# Patient Record
Sex: MALE | Race: WHITE | NOT HISPANIC OR LATINO | Employment: OTHER | URBAN - METROPOLITAN AREA
[De-identification: names, ages, dates, MRNs, and addresses within clinical notes are randomized per-mention and may not be internally consistent; named-entity substitution may affect disease eponyms.]

---

## 2022-07-20 PROCEDURE — 93005 ELECTROCARDIOGRAM TRACING: CPT

## 2022-07-20 PROCEDURE — 99284 EMERGENCY DEPT VISIT MOD MDM: CPT

## 2022-07-21 ENCOUNTER — HOSPITAL ENCOUNTER (INPATIENT)
Facility: HOSPITAL | Age: 82
LOS: 5 days | Discharge: HOME | End: 2022-07-26
Attending: EMERGENCY MEDICINE | Admitting: HOSPITALIST

## 2022-07-21 ENCOUNTER — APPOINTMENT (OUTPATIENT)
Dept: GENERAL RADIOLOGY | Facility: HOSPITAL | Age: 82
End: 2022-07-21

## 2022-07-21 DIAGNOSIS — J18.9 PNEUMONIA OF RIGHT MIDDLE LOBE DUE TO INFECTIOUS ORGANISM: ICD-10-CM

## 2022-07-21 DIAGNOSIS — D72.829 LEUKOCYTOSIS, UNSPECIFIED TYPE: Primary | ICD-10-CM

## 2022-07-21 DIAGNOSIS — Z20.822 COVID-19 RULED OUT BY LABORATORY TESTING: ICD-10-CM

## 2022-07-21 DIAGNOSIS — R06.00 DYSPNEA, UNSPECIFIED TYPE: ICD-10-CM

## 2022-07-21 LAB
ALBUMIN SERPL-MCNC: 3.9 G/DL (ref 3.5–5.2)
ALBUMIN/GLOB SERPL: 1.6 G/DL
ALP SERPL-CCNC: 113 U/L (ref 39–117)
ALT SERPL W P-5'-P-CCNC: 12 U/L (ref 1–41)
ANION GAP SERPL CALCULATED.3IONS-SCNC: 12 MMOL/L (ref 5–15)
ANION GAP SERPL CALCULATED.3IONS-SCNC: 13 MMOL/L (ref 5–15)
AST SERPL-CCNC: 20 U/L (ref 1–40)
B PARAPERT DNA SPEC QL NAA+PROBE: NOT DETECTED
B PERT DNA SPEC QL NAA+PROBE: NOT DETECTED
BILIRUB SERPL-MCNC: 1 MG/DL (ref 0–1.2)
BUN SERPL-MCNC: 15 MG/DL (ref 8–23)
BUN SERPL-MCNC: 15 MG/DL (ref 8–23)
BUN/CREAT SERPL: 12.3 (ref 7–25)
BUN/CREAT SERPL: 13 (ref 7–25)
C PNEUM DNA NPH QL NAA+NON-PROBE: NOT DETECTED
CALCIUM SPEC-SCNC: 8.2 MG/DL (ref 8.6–10.5)
CALCIUM SPEC-SCNC: 8.2 MG/DL (ref 8.6–10.5)
CHLORIDE SERPL-SCNC: 100 MMOL/L (ref 98–107)
CHLORIDE SERPL-SCNC: 101 MMOL/L (ref 98–107)
CO2 SERPL-SCNC: 21 MMOL/L (ref 22–29)
CO2 SERPL-SCNC: 24 MMOL/L (ref 22–29)
CREAT SERPL-MCNC: 1.15 MG/DL (ref 0.76–1.27)
CREAT SERPL-MCNC: 1.22 MG/DL (ref 0.76–1.27)
D-LACTATE SERPL-SCNC: 0.5 MMOL/L (ref 0.5–2)
DEPRECATED RDW RBC AUTO: 45.5 FL (ref 37–54)
DEPRECATED RDW RBC AUTO: 45.5 FL (ref 37–54)
EGFRCR SERPLBLD CKD-EPI 2021: 60.3 ML/MIN/1.73
EGFRCR SERPLBLD CKD-EPI 2021: 64.7 ML/MIN/1.73
ERYTHROCYTE [DISTWIDTH] IN BLOOD BY AUTOMATED COUNT: 14.3 % (ref 12.3–15.4)
ERYTHROCYTE [DISTWIDTH] IN BLOOD BY AUTOMATED COUNT: 14.3 % (ref 12.3–15.4)
FLUAV SUBTYP SPEC NAA+PROBE: NOT DETECTED
FLUBV RNA ISLT QL NAA+PROBE: NOT DETECTED
GLOBULIN UR ELPH-MCNC: 2.5 GM/DL
GLUCOSE SERPL-MCNC: 113 MG/DL (ref 65–99)
GLUCOSE SERPL-MCNC: 157 MG/DL (ref 65–99)
HADV DNA SPEC NAA+PROBE: NOT DETECTED
HCOV 229E RNA SPEC QL NAA+PROBE: NOT DETECTED
HCOV HKU1 RNA SPEC QL NAA+PROBE: NOT DETECTED
HCOV NL63 RNA SPEC QL NAA+PROBE: NOT DETECTED
HCOV OC43 RNA SPEC QL NAA+PROBE: NOT DETECTED
HCT VFR BLD AUTO: 36.7 % (ref 37.5–51)
HCT VFR BLD AUTO: 38.5 % (ref 37.5–51)
HGB BLD-MCNC: 12.1 G/DL (ref 13–17.7)
HGB BLD-MCNC: 12.6 G/DL (ref 13–17.7)
HMPV RNA NPH QL NAA+NON-PROBE: NOT DETECTED
HPIV1 RNA ISLT QL NAA+PROBE: NOT DETECTED
HPIV2 RNA SPEC QL NAA+PROBE: NOT DETECTED
HPIV3 RNA NPH QL NAA+PROBE: NOT DETECTED
HPIV4 P GENE NPH QL NAA+PROBE: NOT DETECTED
LYMPHOCYTES # BLD MANUAL: 0.35 10*3/MM3 (ref 0.7–3.1)
LYMPHOCYTES # BLD MANUAL: 0.59 10*3/MM3 (ref 0.7–3.1)
LYMPHOCYTES NFR BLD MANUAL: 2 % (ref 5–12)
LYMPHOCYTES NFR BLD MANUAL: 2 % (ref 5–12)
M PNEUMO IGG SER IA-ACNC: NOT DETECTED
MCH RBC QN AUTO: 30.1 PG (ref 26.6–33)
MCH RBC QN AUTO: 30.2 PG (ref 26.6–33)
MCHC RBC AUTO-ENTMCNC: 32.7 G/DL (ref 31.5–35.7)
MCHC RBC AUTO-ENTMCNC: 32.8 G/DL (ref 31.5–35.7)
MCV RBC AUTO: 91.8 FL (ref 79–97)
MCV RBC AUTO: 91.8 FL (ref 79–97)
METAMYELOCYTES NFR BLD MANUAL: 1 % (ref 0–0)
METAMYELOCYTES NFR BLD MANUAL: 1 % (ref 0–0)
MONOCYTES # BLD: 0.35 10*3/MM3 (ref 0.1–0.9)
MONOCYTES # BLD: 0.39 10*3/MM3 (ref 0.1–0.9)
NEUTROPHILS # BLD AUTO: 16.44 10*3/MM3 (ref 1.7–7)
NEUTROPHILS # BLD AUTO: 18.52 10*3/MM3 (ref 1.7–7)
NEUTROPHILS NFR BLD MANUAL: 77 % (ref 42.7–76)
NEUTROPHILS NFR BLD MANUAL: 85 % (ref 42.7–76)
NEUTS BAND NFR BLD MANUAL: 18 % (ref 0–5)
NEUTS BAND NFR BLD MANUAL: 9 % (ref 0–5)
NT-PROBNP SERPL-MCNC: 593.1 PG/ML (ref 0–1800)
PLAT MORPH BLD: NORMAL
PLATELET # BLD AUTO: 217 10*3/MM3 (ref 140–450)
PLATELET # BLD AUTO: 232 10*3/MM3 (ref 140–450)
PMV BLD AUTO: 8.1 FL (ref 6–12)
PMV BLD AUTO: 8.2 FL (ref 6–12)
POIKILOCYTOSIS BLD QL SMEAR: ABNORMAL
POTASSIUM SERPL-SCNC: 4 MMOL/L (ref 3.5–5.2)
POTASSIUM SERPL-SCNC: 4.6 MMOL/L (ref 3.5–5.2)
PROCALCITONIN SERPL-MCNC: 0.47 NG/ML (ref 0–0.25)
PROT SERPL-MCNC: 6.4 G/DL (ref 6–8.5)
RBC # BLD AUTO: 4 10*6/MM3 (ref 4.14–5.8)
RBC # BLD AUTO: 4.19 10*6/MM3 (ref 4.14–5.8)
RBC MORPH BLD: NORMAL
RHINOVIRUS RNA SPEC NAA+PROBE: NOT DETECTED
RSV RNA NPH QL NAA+NON-PROBE: NOT DETECTED
SARS-COV-2 RNA NPH QL NAA+NON-PROBE: NOT DETECTED
SCAN SLIDE: NORMAL
SCAN SLIDE: NORMAL
SMALL PLATELETS BLD QL SMEAR: ADEQUATE
SODIUM SERPL-SCNC: 134 MMOL/L (ref 136–145)
SODIUM SERPL-SCNC: 137 MMOL/L (ref 136–145)
TROPONIN T SERPL-MCNC: <0.01 NG/ML (ref 0–0.03)
VARIANT LYMPHS NFR BLD MANUAL: 2 % (ref 19.6–45.3)
VARIANT LYMPHS NFR BLD MANUAL: 3 % (ref 19.6–45.3)
WBC MORPH BLD: NORMAL
WBC MORPH BLD: NORMAL
WBC NRBC COR # BLD: 17.3 10*3/MM3 (ref 3.4–10.8)
WBC NRBC COR # BLD: 19.7 10*3/MM3 (ref 3.4–10.8)

## 2022-07-21 PROCEDURE — 25010000002 ENOXAPARIN PER 10 MG: Performed by: INTERNAL MEDICINE

## 2022-07-21 PROCEDURE — 84484 ASSAY OF TROPONIN QUANT: CPT | Performed by: NURSE PRACTITIONER

## 2022-07-21 PROCEDURE — 83880 ASSAY OF NATRIURETIC PEPTIDE: CPT | Performed by: NURSE PRACTITIONER

## 2022-07-21 PROCEDURE — 94799 UNLISTED PULMONARY SVC/PX: CPT

## 2022-07-21 PROCEDURE — 94640 AIRWAY INHALATION TREATMENT: CPT

## 2022-07-21 PROCEDURE — 80053 COMPREHEN METABOLIC PANEL: CPT | Performed by: NURSE PRACTITIONER

## 2022-07-21 PROCEDURE — 71045 X-RAY EXAM CHEST 1 VIEW: CPT

## 2022-07-21 PROCEDURE — 85007 BL SMEAR W/DIFF WBC COUNT: CPT | Performed by: HOSPITALIST

## 2022-07-21 PROCEDURE — 85025 COMPLETE CBC W/AUTO DIFF WBC: CPT | Performed by: NURSE PRACTITIONER

## 2022-07-21 PROCEDURE — 84145 PROCALCITONIN (PCT): CPT | Performed by: INTERNAL MEDICINE

## 2022-07-21 PROCEDURE — 99222 1ST HOSP IP/OBS MODERATE 55: CPT | Performed by: HOSPITALIST

## 2022-07-21 PROCEDURE — 85025 COMPLETE CBC W/AUTO DIFF WBC: CPT | Performed by: HOSPITALIST

## 2022-07-21 PROCEDURE — 25010000002 METHYLPREDNISOLONE PER 125 MG: Performed by: NURSE PRACTITIONER

## 2022-07-21 PROCEDURE — 25010000002 AZITHROMYCIN PER 500 MG: Performed by: HOSPITALIST

## 2022-07-21 PROCEDURE — 87040 BLOOD CULTURE FOR BACTERIA: CPT | Performed by: NURSE PRACTITIONER

## 2022-07-21 PROCEDURE — 0202U NFCT DS 22 TRGT SARS-COV-2: CPT | Performed by: NURSE PRACTITIONER

## 2022-07-21 PROCEDURE — 85007 BL SMEAR W/DIFF WBC COUNT: CPT | Performed by: NURSE PRACTITIONER

## 2022-07-21 PROCEDURE — 36415 COLL VENOUS BLD VENIPUNCTURE: CPT

## 2022-07-21 PROCEDURE — 83605 ASSAY OF LACTIC ACID: CPT

## 2022-07-21 PROCEDURE — 63710000001 PREDNISONE PER 1 MG: Performed by: HOSPITALIST

## 2022-07-21 PROCEDURE — 36415 COLL VENOUS BLD VENIPUNCTURE: CPT | Performed by: HOSPITALIST

## 2022-07-21 PROCEDURE — 25010000002 CEFTRIAXONE PER 250 MG: Performed by: NURSE PRACTITIONER

## 2022-07-21 RX ORDER — METHYLPREDNISOLONE SODIUM SUCCINATE 125 MG/2ML
125 INJECTION, POWDER, LYOPHILIZED, FOR SOLUTION INTRAMUSCULAR; INTRAVENOUS ONCE
Status: COMPLETED | OUTPATIENT
Start: 2022-07-21 | End: 2022-07-21

## 2022-07-21 RX ORDER — BENZONATATE 100 MG/1
100 CAPSULE ORAL DAILY
COMMUNITY
End: 2022-07-21

## 2022-07-21 RX ORDER — ONDANSETRON 4 MG/1
4 TABLET, FILM COATED ORAL EVERY 6 HOURS PRN
Status: DISCONTINUED | OUTPATIENT
Start: 2022-07-21 | End: 2022-07-26 | Stop reason: HOSPADM

## 2022-07-21 RX ORDER — LOSARTAN POTASSIUM 25 MG/1
100 TABLET ORAL
Status: DISCONTINUED | OUTPATIENT
Start: 2022-07-21 | End: 2022-07-26 | Stop reason: HOSPADM

## 2022-07-21 RX ORDER — PROPRANOLOL HYDROCHLORIDE 10 MG/1
10 TABLET ORAL 2 TIMES DAILY
Status: DISCONTINUED | OUTPATIENT
Start: 2022-07-21 | End: 2022-07-23

## 2022-07-21 RX ORDER — ATORVASTATIN CALCIUM 20 MG/1
20 TABLET, FILM COATED ORAL DAILY
COMMUNITY

## 2022-07-21 RX ORDER — MELATONIN
1000 DAILY
Status: DISCONTINUED | OUTPATIENT
Start: 2022-07-21 | End: 2022-07-26 | Stop reason: HOSPADM

## 2022-07-21 RX ORDER — IPRATROPIUM BROMIDE AND ALBUTEROL SULFATE 2.5; .5 MG/3ML; MG/3ML
3 SOLUTION RESPIRATORY (INHALATION) ONCE
Status: COMPLETED | OUTPATIENT
Start: 2022-07-21 | End: 2022-07-21

## 2022-07-21 RX ORDER — ALUMINA, MAGNESIA, AND SIMETHICONE 2400; 2400; 240 MG/30ML; MG/30ML; MG/30ML
15 SUSPENSION ORAL EVERY 6 HOURS PRN
Status: DISCONTINUED | OUTPATIENT
Start: 2022-07-21 | End: 2022-07-26 | Stop reason: HOSPADM

## 2022-07-21 RX ORDER — IPRATROPIUM BROMIDE AND ALBUTEROL SULFATE 2.5; .5 MG/3ML; MG/3ML
3 SOLUTION RESPIRATORY (INHALATION) EVERY 4 HOURS PRN
COMMUNITY
End: 2023-02-10 | Stop reason: SDUPTHER

## 2022-07-21 RX ORDER — BUDESONIDE AND FORMOTEROL FUMARATE DIHYDRATE 80; 4.5 UG/1; UG/1
2 AEROSOL RESPIRATORY (INHALATION)
Status: DISCONTINUED | OUTPATIENT
Start: 2022-07-21 | End: 2022-07-22

## 2022-07-21 RX ORDER — OMEPRAZOLE 20 MG/1
20 CAPSULE, DELAYED RELEASE ORAL DAILY
COMMUNITY
End: 2023-02-10 | Stop reason: SDUPTHER

## 2022-07-21 RX ORDER — IPRATROPIUM BROMIDE AND ALBUTEROL SULFATE 2.5; .5 MG/3ML; MG/3ML
3 SOLUTION RESPIRATORY (INHALATION)
Status: DISCONTINUED | OUTPATIENT
Start: 2022-07-21 | End: 2022-07-23

## 2022-07-21 RX ORDER — ONDANSETRON 2 MG/ML
4 INJECTION INTRAMUSCULAR; INTRAVENOUS EVERY 6 HOURS PRN
Status: DISCONTINUED | OUTPATIENT
Start: 2022-07-21 | End: 2022-07-26 | Stop reason: HOSPADM

## 2022-07-21 RX ORDER — PREDNISONE 20 MG/1
40 TABLET ORAL
Status: DISCONTINUED | OUTPATIENT
Start: 2022-07-21 | End: 2022-07-23

## 2022-07-21 RX ORDER — CHOLECALCIFEROL (VITAMIN D3) 125 MCG
5 CAPSULE ORAL NIGHTLY PRN
Status: DISCONTINUED | OUTPATIENT
Start: 2022-07-21 | End: 2022-07-26 | Stop reason: HOSPADM

## 2022-07-21 RX ORDER — BENZONATATE 100 MG/1
100 CAPSULE ORAL 3 TIMES DAILY PRN
Status: DISCONTINUED | OUTPATIENT
Start: 2022-07-21 | End: 2022-07-26 | Stop reason: HOSPADM

## 2022-07-21 RX ORDER — IPRATROPIUM BROMIDE AND ALBUTEROL SULFATE 2.5; .5 MG/3ML; MG/3ML
3 SOLUTION RESPIRATORY (INHALATION) EVERY 4 HOURS PRN
Status: DISCONTINUED | OUTPATIENT
Start: 2022-07-21 | End: 2022-07-26 | Stop reason: HOSPADM

## 2022-07-21 RX ORDER — IRBESARTAN 75 MG/1
75 TABLET ORAL DAILY
COMMUNITY
End: 2022-07-21

## 2022-07-21 RX ORDER — MELATONIN
1000 DAILY
COMMUNITY

## 2022-07-21 RX ORDER — SODIUM CHLORIDE 0.9 % (FLUSH) 0.9 %
10 SYRINGE (ML) INJECTION AS NEEDED
Status: DISCONTINUED | OUTPATIENT
Start: 2022-07-21 | End: 2022-07-26 | Stop reason: HOSPADM

## 2022-07-21 RX ORDER — HYDROCHLOROTHIAZIDE 25 MG/1
25 TABLET ORAL DAILY
Status: DISCONTINUED | OUTPATIENT
Start: 2022-07-21 | End: 2022-07-24

## 2022-07-21 RX ORDER — ACETAMINOPHEN 650 MG/1
650 SUPPOSITORY RECTAL EVERY 4 HOURS PRN
Status: DISCONTINUED | OUTPATIENT
Start: 2022-07-21 | End: 2022-07-26 | Stop reason: HOSPADM

## 2022-07-21 RX ORDER — ALBUTEROL SULFATE 2.5 MG/3ML
2.5 SOLUTION RESPIRATORY (INHALATION) EVERY 4 HOURS PRN
Status: DISCONTINUED | OUTPATIENT
Start: 2022-07-21 | End: 2022-07-26 | Stop reason: HOSPADM

## 2022-07-21 RX ORDER — ATORVASTATIN CALCIUM 20 MG/1
20 TABLET, FILM COATED ORAL NIGHTLY
Status: DISCONTINUED | OUTPATIENT
Start: 2022-07-21 | End: 2022-07-26 | Stop reason: HOSPADM

## 2022-07-21 RX ORDER — SODIUM CHLORIDE 0.9 % (FLUSH) 0.9 %
10 SYRINGE (ML) INJECTION EVERY 12 HOURS SCHEDULED
Status: DISCONTINUED | OUTPATIENT
Start: 2022-07-21 | End: 2022-07-26 | Stop reason: HOSPADM

## 2022-07-21 RX ORDER — HYDROCHLOROTHIAZIDE 25 MG/1
25 TABLET ORAL DAILY
COMMUNITY
End: 2022-07-26 | Stop reason: HOSPADM

## 2022-07-21 RX ORDER — NITROGLYCERIN 0.4 MG/1
0.4 TABLET SUBLINGUAL
Status: DISCONTINUED | OUTPATIENT
Start: 2022-07-21 | End: 2022-07-26 | Stop reason: HOSPADM

## 2022-07-21 RX ORDER — ENOXAPARIN SODIUM 100 MG/ML
40 INJECTION SUBCUTANEOUS DAILY
Status: DISCONTINUED | OUTPATIENT
Start: 2022-07-21 | End: 2022-07-26 | Stop reason: HOSPADM

## 2022-07-21 RX ORDER — ACETAMINOPHEN 325 MG/1
650 TABLET ORAL EVERY 4 HOURS PRN
Status: DISCONTINUED | OUTPATIENT
Start: 2022-07-21 | End: 2022-07-26 | Stop reason: HOSPADM

## 2022-07-21 RX ORDER — ALBUTEROL SULFATE 90 UG/1
1 AEROSOL, METERED RESPIRATORY (INHALATION) EVERY 4 HOURS PRN
COMMUNITY
End: 2023-02-10 | Stop reason: SDUPTHER

## 2022-07-21 RX ORDER — PANTOPRAZOLE SODIUM 40 MG/1
40 TABLET, DELAYED RELEASE ORAL EVERY MORNING
Status: DISCONTINUED | OUTPATIENT
Start: 2022-07-22 | End: 2022-07-26 | Stop reason: HOSPADM

## 2022-07-21 RX ORDER — IRBESARTAN 300 MG/1
300 TABLET ORAL DAILY
COMMUNITY
End: 2023-02-10

## 2022-07-21 RX ORDER — BENZONATATE 100 MG/1
100 CAPSULE ORAL 3 TIMES DAILY PRN
COMMUNITY
End: 2023-02-10

## 2022-07-21 RX ORDER — IPRATROPIUM BROMIDE AND ALBUTEROL SULFATE 2.5; .5 MG/3ML; MG/3ML
3 SOLUTION RESPIRATORY (INHALATION) AS NEEDED
COMMUNITY
End: 2022-07-21

## 2022-07-21 RX ORDER — ACETAMINOPHEN 160 MG/5ML
650 SOLUTION ORAL EVERY 4 HOURS PRN
Status: DISCONTINUED | OUTPATIENT
Start: 2022-07-21 | End: 2022-07-26 | Stop reason: HOSPADM

## 2022-07-21 RX ORDER — PROPRANOLOL HYDROCHLORIDE 10 MG/1
10 TABLET ORAL 2 TIMES DAILY
COMMUNITY
End: 2022-07-26 | Stop reason: HOSPADM

## 2022-07-21 RX ADMIN — Medication 1000 UNITS: at 15:48

## 2022-07-21 RX ADMIN — PREDNISONE 40 MG: 20 TABLET ORAL at 08:23

## 2022-07-21 RX ADMIN — METHYLPREDNISOLONE SODIUM SUCCINATE 125 MG: 125 INJECTION, POWDER, FOR SOLUTION INTRAMUSCULAR; INTRAVENOUS at 01:40

## 2022-07-21 RX ADMIN — LOSARTAN POTASSIUM 100 MG: 25 TABLET, FILM COATED ORAL at 15:47

## 2022-07-21 RX ADMIN — PROPRANOLOL HYDROCHLORIDE 10 MG: 10 TABLET ORAL at 20:34

## 2022-07-21 RX ADMIN — CEFTRIAXONE 1 G: 1 INJECTION, POWDER, FOR SOLUTION INTRAMUSCULAR; INTRAVENOUS at 02:40

## 2022-07-21 RX ADMIN — HYDROCHLOROTHIAZIDE 25 MG: 25 TABLET ORAL at 15:48

## 2022-07-21 RX ADMIN — IPRATROPIUM BROMIDE AND ALBUTEROL SULFATE 3 ML: .5; 3 SOLUTION RESPIRATORY (INHALATION) at 03:09

## 2022-07-21 RX ADMIN — IPRATROPIUM BROMIDE AND ALBUTEROL SULFATE 3 ML: .5; 3 SOLUTION RESPIRATORY (INHALATION) at 07:42

## 2022-07-21 RX ADMIN — IPRATROPIUM BROMIDE AND ALBUTEROL SULFATE 3 ML: .5; 3 SOLUTION RESPIRATORY (INHALATION) at 19:12

## 2022-07-21 RX ADMIN — BUDESONIDE AND FORMOTEROL FUMARATE DIHYDRATE 2 PUFF: 80; 4.5 AEROSOL RESPIRATORY (INHALATION) at 19:12

## 2022-07-21 RX ADMIN — ENOXAPARIN SODIUM 40 MG: 100 INJECTION SUBCUTANEOUS at 15:48

## 2022-07-21 RX ADMIN — IPRATROPIUM BROMIDE AND ALBUTEROL SULFATE 3 ML: .5; 3 SOLUTION RESPIRATORY (INHALATION) at 11:01

## 2022-07-21 RX ADMIN — Medication 10 ML: at 20:38

## 2022-07-21 RX ADMIN — Medication 10 ML: at 08:24

## 2022-07-21 RX ADMIN — ATORVASTATIN CALCIUM 20 MG: 20 TABLET, FILM COATED ORAL at 20:34

## 2022-07-21 RX ADMIN — AZITHROMYCIN MONOHYDRATE 500 MG: 500 INJECTION, POWDER, LYOPHILIZED, FOR SOLUTION INTRAVENOUS at 05:27

## 2022-07-22 LAB — PROCALCITONIN SERPL-MCNC: 0.26 NG/ML (ref 0–0.25)

## 2022-07-22 PROCEDURE — 94799 UNLISTED PULMONARY SVC/PX: CPT

## 2022-07-22 PROCEDURE — 94761 N-INVAS EAR/PLS OXIMETRY MLT: CPT

## 2022-07-22 PROCEDURE — 63710000001 PREDNISONE PER 1 MG: Performed by: INTERNAL MEDICINE

## 2022-07-22 PROCEDURE — 94664 DEMO&/EVAL PT USE INHALER: CPT

## 2022-07-22 PROCEDURE — 99232 SBSQ HOSP IP/OBS MODERATE 35: CPT | Performed by: INTERNAL MEDICINE

## 2022-07-22 PROCEDURE — 25010000002 ENOXAPARIN PER 10 MG: Performed by: INTERNAL MEDICINE

## 2022-07-22 PROCEDURE — 25010000002 AZITHROMYCIN PER 500 MG: Performed by: INTERNAL MEDICINE

## 2022-07-22 PROCEDURE — 25010000002 CEFTRIAXONE PER 250 MG: Performed by: INTERNAL MEDICINE

## 2022-07-22 PROCEDURE — 84145 PROCALCITONIN (PCT): CPT | Performed by: INTERNAL MEDICINE

## 2022-07-22 RX ORDER — ARFORMOTEROL TARTRATE 15 UG/2ML
15 SOLUTION RESPIRATORY (INHALATION)
Status: DISCONTINUED | OUTPATIENT
Start: 2022-07-22 | End: 2022-07-26 | Stop reason: HOSPADM

## 2022-07-22 RX ORDER — BUDESONIDE 0.5 MG/2ML
0.5 INHALANT ORAL
Status: DISCONTINUED | OUTPATIENT
Start: 2022-07-22 | End: 2022-07-26 | Stop reason: HOSPADM

## 2022-07-22 RX ADMIN — AZITHROMYCIN MONOHYDRATE 500 MG: 500 INJECTION, POWDER, LYOPHILIZED, FOR SOLUTION INTRAVENOUS at 03:02

## 2022-07-22 RX ADMIN — LOSARTAN POTASSIUM 100 MG: 25 TABLET, FILM COATED ORAL at 12:22

## 2022-07-22 RX ADMIN — Medication 10 ML: at 08:18

## 2022-07-22 RX ADMIN — HYDROCHLOROTHIAZIDE 25 MG: 25 TABLET ORAL at 08:18

## 2022-07-22 RX ADMIN — IPRATROPIUM BROMIDE AND ALBUTEROL SULFATE 3 ML: .5; 3 SOLUTION RESPIRATORY (INHALATION) at 19:59

## 2022-07-22 RX ADMIN — BUDESONIDE 0.5 MG: 0.5 INHALANT RESPIRATORY (INHALATION) at 19:59

## 2022-07-22 RX ADMIN — BUDESONIDE AND FORMOTEROL FUMARATE DIHYDRATE 2 PUFF: 80; 4.5 AEROSOL RESPIRATORY (INHALATION) at 07:20

## 2022-07-22 RX ADMIN — IPRATROPIUM BROMIDE AND ALBUTEROL SULFATE 3 ML: .5; 3 SOLUTION RESPIRATORY (INHALATION) at 11:07

## 2022-07-22 RX ADMIN — PREDNISONE 40 MG: 20 TABLET ORAL at 08:18

## 2022-07-22 RX ADMIN — IPRATROPIUM BROMIDE AND ALBUTEROL SULFATE 3 ML: .5; 3 SOLUTION RESPIRATORY (INHALATION) at 07:20

## 2022-07-22 RX ADMIN — PROPRANOLOL HYDROCHLORIDE 10 MG: 10 TABLET ORAL at 08:18

## 2022-07-22 RX ADMIN — Medication 1000 UNITS: at 08:18

## 2022-07-22 RX ADMIN — PANTOPRAZOLE SODIUM 40 MG: 40 TABLET, DELAYED RELEASE ORAL at 07:17

## 2022-07-22 RX ADMIN — ENOXAPARIN SODIUM 40 MG: 100 INJECTION SUBCUTANEOUS at 17:16

## 2022-07-22 RX ADMIN — IPRATROPIUM BROMIDE AND ALBUTEROL SULFATE 3 ML: .5; 3 SOLUTION RESPIRATORY (INHALATION) at 15:15

## 2022-07-22 RX ADMIN — Medication 10 ML: at 20:01

## 2022-07-22 RX ADMIN — CEFTRIAXONE SODIUM 1 G: 1 INJECTION, POWDER, FOR SOLUTION INTRAMUSCULAR; INTRAVENOUS at 03:01

## 2022-07-22 RX ADMIN — ATORVASTATIN CALCIUM 20 MG: 20 TABLET, FILM COATED ORAL at 20:01

## 2022-07-22 RX ADMIN — PROPRANOLOL HYDROCHLORIDE 10 MG: 10 TABLET ORAL at 20:01

## 2022-07-23 LAB
ALBUMIN SERPL-MCNC: 3.8 G/DL (ref 3.5–5.2)
ALBUMIN/GLOB SERPL: 1.2 G/DL
ALP SERPL-CCNC: 132 U/L (ref 39–117)
ALT SERPL W P-5'-P-CCNC: 40 U/L (ref 1–41)
ANION GAP SERPL CALCULATED.3IONS-SCNC: 13 MMOL/L (ref 5–15)
AST SERPL-CCNC: 36 U/L (ref 1–40)
BASOPHILS # BLD AUTO: 0 10*3/MM3 (ref 0–0.2)
BASOPHILS NFR BLD AUTO: 0.4 % (ref 0–1.5)
BILIRUB SERPL-MCNC: 0.3 MG/DL (ref 0–1.2)
BUN SERPL-MCNC: 23 MG/DL (ref 8–23)
BUN/CREAT SERPL: 17.4 (ref 7–25)
CALCIUM SPEC-SCNC: 8.4 MG/DL (ref 8.6–10.5)
CHLORIDE SERPL-SCNC: 98 MMOL/L (ref 98–107)
CO2 SERPL-SCNC: 21 MMOL/L (ref 22–29)
CREAT SERPL-MCNC: 1.32 MG/DL (ref 0.76–1.27)
DEPRECATED RDW RBC AUTO: 47.7 FL (ref 37–54)
EGFRCR SERPLBLD CKD-EPI 2021: 54.9 ML/MIN/1.73
EOSINOPHIL # BLD AUTO: 0 10*3/MM3 (ref 0–0.4)
EOSINOPHIL NFR BLD AUTO: 0 % (ref 0.3–6.2)
ERYTHROCYTE [DISTWIDTH] IN BLOOD BY AUTOMATED COUNT: 14.8 % (ref 12.3–15.4)
GLOBULIN UR ELPH-MCNC: 3.1 GM/DL
GLUCOSE SERPL-MCNC: 183 MG/DL (ref 65–99)
HCT VFR BLD AUTO: 40.1 % (ref 37.5–51)
HGB BLD-MCNC: 13.3 G/DL (ref 13–17.7)
LYMPHOCYTES # BLD AUTO: 0.6 10*3/MM3 (ref 0.7–3.1)
LYMPHOCYTES NFR BLD AUTO: 4.9 % (ref 19.6–45.3)
MCH RBC QN AUTO: 30.4 PG (ref 26.6–33)
MCHC RBC AUTO-ENTMCNC: 33.2 G/DL (ref 31.5–35.7)
MCV RBC AUTO: 91.7 FL (ref 79–97)
MONOCYTES # BLD AUTO: 0.4 10*3/MM3 (ref 0.1–0.9)
MONOCYTES NFR BLD AUTO: 2.9 % (ref 5–12)
NEUTROPHILS NFR BLD AUTO: 11.7 10*3/MM3 (ref 1.7–7)
NEUTROPHILS NFR BLD AUTO: 91.8 % (ref 42.7–76)
NRBC BLD AUTO-RTO: 0 /100 WBC (ref 0–0.2)
PLATELET # BLD AUTO: 247 10*3/MM3 (ref 140–450)
PMV BLD AUTO: 8.4 FL (ref 6–12)
POTASSIUM SERPL-SCNC: 4.4 MMOL/L (ref 3.5–5.2)
PROCALCITONIN SERPL-MCNC: 0.16 NG/ML (ref 0–0.25)
PROCALCITONIN SERPL-MCNC: 0.22 NG/ML (ref 0–0.25)
PROT SERPL-MCNC: 6.9 G/DL (ref 6–8.5)
RBC # BLD AUTO: 4.38 10*6/MM3 (ref 4.14–5.8)
SODIUM SERPL-SCNC: 132 MMOL/L (ref 136–145)
WBC NRBC COR # BLD: 12.8 10*3/MM3 (ref 3.4–10.8)

## 2022-07-23 PROCEDURE — 94799 UNLISTED PULMONARY SVC/PX: CPT

## 2022-07-23 PROCEDURE — 63710000001 PREDNISONE PER 1 MG: Performed by: INTERNAL MEDICINE

## 2022-07-23 PROCEDURE — 25010000002 AZITHROMYCIN PER 500 MG: Performed by: INTERNAL MEDICINE

## 2022-07-23 PROCEDURE — 85025 COMPLETE CBC W/AUTO DIFF WBC: CPT | Performed by: INTERNAL MEDICINE

## 2022-07-23 PROCEDURE — 84145 PROCALCITONIN (PCT): CPT | Performed by: INTERNAL MEDICINE

## 2022-07-23 PROCEDURE — 99232 SBSQ HOSP IP/OBS MODERATE 35: CPT | Performed by: INTERNAL MEDICINE

## 2022-07-23 PROCEDURE — 25010000002 CEFTRIAXONE PER 250 MG: Performed by: INTERNAL MEDICINE

## 2022-07-23 PROCEDURE — 80053 COMPREHEN METABOLIC PANEL: CPT | Performed by: INTERNAL MEDICINE

## 2022-07-23 PROCEDURE — 25010000002 ENOXAPARIN PER 10 MG: Performed by: INTERNAL MEDICINE

## 2022-07-23 RX ORDER — AZITHROMYCIN 250 MG/1
500 TABLET, FILM COATED ORAL DAILY
Qty: 5 TABLET | Refills: 0 | Status: CANCELLED | OUTPATIENT
Start: 2022-07-23

## 2022-07-23 RX ORDER — PREDNISONE 20 MG/1
10 TABLET ORAL
Qty: 6 TABLET | Refills: 0 | Status: CANCELLED | OUTPATIENT
Start: 2022-07-24

## 2022-07-23 RX ORDER — AZITHROMYCIN 250 MG/1
500 TABLET, FILM COATED ORAL
Status: COMPLETED | OUTPATIENT
Start: 2022-07-24 | End: 2022-07-25

## 2022-07-23 RX ADMIN — Medication 1000 UNITS: at 08:17

## 2022-07-23 RX ADMIN — AZITHROMYCIN MONOHYDRATE 500 MG: 500 INJECTION, POWDER, LYOPHILIZED, FOR SOLUTION INTRAVENOUS at 04:04

## 2022-07-23 RX ADMIN — ENOXAPARIN SODIUM 40 MG: 100 INJECTION SUBCUTANEOUS at 15:46

## 2022-07-23 RX ADMIN — ARFORMOTEROL TARTRATE 15 MCG: 15 SOLUTION RESPIRATORY (INHALATION) at 20:18

## 2022-07-23 RX ADMIN — BUDESONIDE 0.5 MG: 0.5 INHALANT RESPIRATORY (INHALATION) at 07:58

## 2022-07-23 RX ADMIN — PANTOPRAZOLE SODIUM 40 MG: 40 TABLET, DELAYED RELEASE ORAL at 06:33

## 2022-07-23 RX ADMIN — Medication 10 ML: at 08:18

## 2022-07-23 RX ADMIN — ATORVASTATIN CALCIUM 20 MG: 20 TABLET, FILM COATED ORAL at 20:07

## 2022-07-23 RX ADMIN — BENZONATATE 100 MG: 100 CAPSULE ORAL at 02:05

## 2022-07-23 RX ADMIN — Medication 10 ML: at 20:07

## 2022-07-23 RX ADMIN — ACETAMINOPHEN 650 MG: 325 TABLET, FILM COATED ORAL at 02:04

## 2022-07-23 RX ADMIN — BUDESONIDE 0.5 MG: 0.5 INHALANT RESPIRATORY (INHALATION) at 20:23

## 2022-07-23 RX ADMIN — CEFTRIAXONE SODIUM 1 G: 1 INJECTION, POWDER, FOR SOLUTION INTRAMUSCULAR; INTRAVENOUS at 04:02

## 2022-07-23 RX ADMIN — PREDNISONE 40 MG: 20 TABLET ORAL at 08:17

## 2022-07-23 RX ADMIN — ARFORMOTEROL TARTRATE 15 MCG: 15 SOLUTION RESPIRATORY (INHALATION) at 07:54

## 2022-07-24 PROBLEM — J18.9 PNEUMONIA OF RIGHT MIDDLE LOBE DUE TO INFECTIOUS ORGANISM: Status: ACTIVE | Noted: 2022-07-24

## 2022-07-24 PROBLEM — R00.1 BRADYCARDIA: Status: ACTIVE | Noted: 2022-07-24

## 2022-07-24 LAB
ANION GAP SERPL CALCULATED.3IONS-SCNC: 13 MMOL/L (ref 5–15)
BASOPHILS # BLD AUTO: 0 10*3/MM3 (ref 0–0.2)
BASOPHILS NFR BLD AUTO: 0.3 % (ref 0–1.5)
BUN SERPL-MCNC: 21 MG/DL (ref 8–23)
BUN/CREAT SERPL: 15 (ref 7–25)
CALCIUM SPEC-SCNC: 9.2 MG/DL (ref 8.6–10.5)
CHLORIDE SERPL-SCNC: 99 MMOL/L (ref 98–107)
CO2 SERPL-SCNC: 24 MMOL/L (ref 22–29)
CREAT SERPL-MCNC: 1.4 MG/DL (ref 0.76–1.27)
DEPRECATED RDW RBC AUTO: 46.4 FL (ref 37–54)
EGFRCR SERPLBLD CKD-EPI 2021: 51.1 ML/MIN/1.73
EOSINOPHIL # BLD AUTO: 0 10*3/MM3 (ref 0–0.4)
EOSINOPHIL NFR BLD AUTO: 0.4 % (ref 0.3–6.2)
ERYTHROCYTE [DISTWIDTH] IN BLOOD BY AUTOMATED COUNT: 14.5 % (ref 12.3–15.4)
GLUCOSE SERPL-MCNC: 157 MG/DL (ref 65–99)
HCT VFR BLD AUTO: 41.5 % (ref 37.5–51)
HGB BLD-MCNC: 13.4 G/DL (ref 13–17.7)
LYMPHOCYTES # BLD AUTO: 2 10*3/MM3 (ref 0.7–3.1)
LYMPHOCYTES NFR BLD AUTO: 16.3 % (ref 19.6–45.3)
MCH RBC QN AUTO: 29.9 PG (ref 26.6–33)
MCHC RBC AUTO-ENTMCNC: 32.3 G/DL (ref 31.5–35.7)
MCV RBC AUTO: 92.5 FL (ref 79–97)
MONOCYTES # BLD AUTO: 1 10*3/MM3 (ref 0.1–0.9)
MONOCYTES NFR BLD AUTO: 8.5 % (ref 5–12)
NEUTROPHILS NFR BLD AUTO: 74.5 % (ref 42.7–76)
NEUTROPHILS NFR BLD AUTO: 9.1 10*3/MM3 (ref 1.7–7)
NRBC BLD AUTO-RTO: 0 /100 WBC (ref 0–0.2)
NT-PROBNP SERPL-MCNC: 258.1 PG/ML (ref 0–1800)
PLATELET # BLD AUTO: 280 10*3/MM3 (ref 140–450)
PMV BLD AUTO: 8.2 FL (ref 6–12)
POTASSIUM SERPL-SCNC: 3.7 MMOL/L (ref 3.5–5.2)
QT INTERVAL: 379 MS
RBC # BLD AUTO: 4.49 10*6/MM3 (ref 4.14–5.8)
SODIUM SERPL-SCNC: 136 MMOL/L (ref 136–145)
WBC NRBC COR # BLD: 12.3 10*3/MM3 (ref 3.4–10.8)

## 2022-07-24 PROCEDURE — 80048 BASIC METABOLIC PNL TOTAL CA: CPT | Performed by: INTERNAL MEDICINE

## 2022-07-24 PROCEDURE — 94799 UNLISTED PULMONARY SVC/PX: CPT

## 2022-07-24 PROCEDURE — 99232 SBSQ HOSP IP/OBS MODERATE 35: CPT | Performed by: INTERNAL MEDICINE

## 2022-07-24 PROCEDURE — 99222 1ST HOSP IP/OBS MODERATE 55: CPT | Performed by: INTERNAL MEDICINE

## 2022-07-24 PROCEDURE — 83880 ASSAY OF NATRIURETIC PEPTIDE: CPT | Performed by: INTERNAL MEDICINE

## 2022-07-24 PROCEDURE — 25010000002 CEFTRIAXONE PER 250 MG: Performed by: INTERNAL MEDICINE

## 2022-07-24 PROCEDURE — 25010000002 ENOXAPARIN PER 10 MG: Performed by: INTERNAL MEDICINE

## 2022-07-24 PROCEDURE — 85025 COMPLETE CBC W/AUTO DIFF WBC: CPT | Performed by: INTERNAL MEDICINE

## 2022-07-24 RX ORDER — AZITHROMYCIN 250 MG/1
TABLET, FILM COATED ORAL
Qty: 3 TABLET | Refills: 0 | Status: SHIPPED | OUTPATIENT
Start: 2022-07-25 | End: 2022-10-13

## 2022-07-24 RX ORDER — AMLODIPINE BESYLATE 5 MG/1
5 TABLET ORAL
Qty: 30 TABLET | Refills: 0 | Status: SHIPPED | OUTPATIENT
Start: 2022-07-24 | End: 2023-02-10

## 2022-07-24 RX ORDER — SODIUM CHLORIDE, SODIUM LACTATE, POTASSIUM CHLORIDE, CALCIUM CHLORIDE 600; 310; 30; 20 MG/100ML; MG/100ML; MG/100ML; MG/100ML
125 INJECTION, SOLUTION INTRAVENOUS CONTINUOUS
Status: DISCONTINUED | OUTPATIENT
Start: 2022-07-24 | End: 2022-07-26 | Stop reason: HOSPADM

## 2022-07-24 RX ORDER — CEFDINIR 300 MG/1
300 CAPSULE ORAL 2 TIMES DAILY
Qty: 14 CAPSULE | Refills: 0 | Status: SHIPPED | OUTPATIENT
Start: 2022-07-24 | End: 2022-10-13

## 2022-07-24 RX ORDER — HYDROCHLOROTHIAZIDE 12.5 MG/1
12.5 TABLET ORAL DAILY
Status: DISCONTINUED | OUTPATIENT
Start: 2022-07-25 | End: 2022-07-26 | Stop reason: HOSPADM

## 2022-07-24 RX ORDER — AMLODIPINE BESYLATE 5 MG/1
5 TABLET ORAL
Status: DISCONTINUED | OUTPATIENT
Start: 2022-07-24 | End: 2022-07-26 | Stop reason: HOSPADM

## 2022-07-24 RX ADMIN — Medication 1000 UNITS: at 09:18

## 2022-07-24 RX ADMIN — ARFORMOTEROL TARTRATE 15 MCG: 15 SOLUTION RESPIRATORY (INHALATION) at 20:09

## 2022-07-24 RX ADMIN — ENOXAPARIN SODIUM 40 MG: 100 INJECTION SUBCUTANEOUS at 16:57

## 2022-07-24 RX ADMIN — ATORVASTATIN CALCIUM 20 MG: 20 TABLET, FILM COATED ORAL at 20:49

## 2022-07-24 RX ADMIN — AZITHROMYCIN MONOHYDRATE 500 MG: 250 TABLET ORAL at 09:18

## 2022-07-24 RX ADMIN — ARFORMOTEROL TARTRATE 15 MCG: 15 SOLUTION RESPIRATORY (INHALATION) at 07:50

## 2022-07-24 RX ADMIN — BUDESONIDE 0.5 MG: 0.5 INHALANT RESPIRATORY (INHALATION) at 07:55

## 2022-07-24 RX ADMIN — HYDROCHLOROTHIAZIDE 25 MG: 25 TABLET ORAL at 09:18

## 2022-07-24 RX ADMIN — CEFTRIAXONE SODIUM 1 G: 1 INJECTION, POWDER, FOR SOLUTION INTRAMUSCULAR; INTRAVENOUS at 03:42

## 2022-07-24 RX ADMIN — Medication 10 ML: at 20:50

## 2022-07-24 RX ADMIN — AMLODIPINE BESYLATE 5 MG: 5 TABLET ORAL at 16:58

## 2022-07-24 RX ADMIN — BUDESONIDE 0.5 MG: 0.5 INHALANT RESPIRATORY (INHALATION) at 20:14

## 2022-07-24 RX ADMIN — Medication 10 ML: at 09:20

## 2022-07-24 RX ADMIN — LOSARTAN POTASSIUM 100 MG: 25 TABLET, FILM COATED ORAL at 09:19

## 2022-07-24 RX ADMIN — PANTOPRAZOLE SODIUM 40 MG: 40 TABLET, DELAYED RELEASE ORAL at 09:19

## 2022-07-25 ENCOUNTER — APPOINTMENT (OUTPATIENT)
Dept: CARDIOLOGY | Facility: HOSPITAL | Age: 82
End: 2022-07-25

## 2022-07-25 PROCEDURE — 94664 DEMO&/EVAL PT USE INHALER: CPT

## 2022-07-25 PROCEDURE — 94761 N-INVAS EAR/PLS OXIMETRY MLT: CPT

## 2022-07-25 PROCEDURE — 93306 TTE W/DOPPLER COMPLETE: CPT

## 2022-07-25 PROCEDURE — 25010000002 CEFTRIAXONE PER 250 MG: Performed by: INTERNAL MEDICINE

## 2022-07-25 PROCEDURE — 99233 SBSQ HOSP IP/OBS HIGH 50: CPT | Performed by: HOSPITALIST

## 2022-07-25 PROCEDURE — 94760 N-INVAS EAR/PLS OXIMETRY 1: CPT

## 2022-07-25 PROCEDURE — 99232 SBSQ HOSP IP/OBS MODERATE 35: CPT | Performed by: INTERNAL MEDICINE

## 2022-07-25 PROCEDURE — 94799 UNLISTED PULMONARY SVC/PX: CPT

## 2022-07-25 PROCEDURE — 93306 TTE W/DOPPLER COMPLETE: CPT | Performed by: INTERNAL MEDICINE

## 2022-07-25 RX ADMIN — BUDESONIDE 0.5 MG: 0.5 INHALANT RESPIRATORY (INHALATION) at 06:57

## 2022-07-25 RX ADMIN — LOSARTAN POTASSIUM 100 MG: 25 TABLET, FILM COATED ORAL at 09:48

## 2022-07-25 RX ADMIN — CEFTRIAXONE SODIUM 1 G: 1 INJECTION, POWDER, FOR SOLUTION INTRAMUSCULAR; INTRAVENOUS at 03:14

## 2022-07-25 RX ADMIN — HYDROCHLOROTHIAZIDE 12.5 MG: 12.5 TABLET ORAL at 09:48

## 2022-07-25 RX ADMIN — Medication 10 ML: at 09:49

## 2022-07-25 RX ADMIN — Medication 1000 UNITS: at 09:49

## 2022-07-25 RX ADMIN — ATORVASTATIN CALCIUM 20 MG: 20 TABLET, FILM COATED ORAL at 20:37

## 2022-07-25 RX ADMIN — AMLODIPINE BESYLATE 5 MG: 5 TABLET ORAL at 09:49

## 2022-07-25 RX ADMIN — Medication 10 ML: at 20:36

## 2022-07-25 RX ADMIN — BUDESONIDE 0.5 MG: 0.5 INHALANT RESPIRATORY (INHALATION) at 18:40

## 2022-07-25 RX ADMIN — ARFORMOTEROL TARTRATE 15 MCG: 15 SOLUTION RESPIRATORY (INHALATION) at 06:57

## 2022-07-25 RX ADMIN — ARFORMOTEROL TARTRATE 15 MCG: 15 SOLUTION RESPIRATORY (INHALATION) at 18:35

## 2022-07-25 RX ADMIN — PANTOPRAZOLE SODIUM 40 MG: 40 TABLET, DELAYED RELEASE ORAL at 09:48

## 2022-07-25 RX ADMIN — AZITHROMYCIN MONOHYDRATE 500 MG: 250 TABLET ORAL at 09:48

## 2022-07-26 VITALS
OXYGEN SATURATION: 94 % | WEIGHT: 206.35 LBS | RESPIRATION RATE: 16 BRPM | SYSTOLIC BLOOD PRESSURE: 120 MMHG | DIASTOLIC BLOOD PRESSURE: 57 MMHG | TEMPERATURE: 98.4 F | HEART RATE: 71 BPM | HEIGHT: 71 IN | BODY MASS INDEX: 28.89 KG/M2

## 2022-07-26 PROBLEM — R00.1 BRADYCARDIA: Status: RESOLVED | Noted: 2022-07-24 | Resolved: 2022-07-26

## 2022-07-26 LAB
BACTERIA SPEC AEROBE CULT: NORMAL
BACTERIA SPEC AEROBE CULT: NORMAL
BH CV ECHO MEAS - ACS: 1.94 CM
BH CV ECHO MEAS - AO MAX PG: 8.2 MMHG
BH CV ECHO MEAS - AO MEAN PG: 4.7 MMHG
BH CV ECHO MEAS - AO ROOT DIAM: 3.2 CM
BH CV ECHO MEAS - AO V2 MAX: 143.2 CM/SEC
BH CV ECHO MEAS - AO V2 VTI: 25.1 CM
BH CV ECHO MEAS - AVA(I,D): 1.62 CM2
BH CV ECHO MEAS - EDV(CUBED): 131.5 ML
BH CV ECHO MEAS - EDV(MOD-SP4): 57.9 ML
BH CV ECHO MEAS - EF(MOD-BP): 60 %
BH CV ECHO MEAS - EF(MOD-SP4): 59.6 %
BH CV ECHO MEAS - ESV(CUBED): 28.9 ML
BH CV ECHO MEAS - ESV(MOD-SP4): 23.4 ML
BH CV ECHO MEAS - FS: 39.7 %
BH CV ECHO MEAS - IVS/LVPW: 1.2 CM
BH CV ECHO MEAS - IVSD: 1.11 CM
BH CV ECHO MEAS - LA A2CS (ATRIAL LENGTH): 4 CM
BH CV ECHO MEAS - LV DIASTOLIC VOL/BSA (35-75): 27.2 CM2
BH CV ECHO MEAS - LV MASS(C)D: 191.7 GRAMS
BH CV ECHO MEAS - LV MAX PG: 1.53 MMHG
BH CV ECHO MEAS - LV MEAN PG: 0.79 MMHG
BH CV ECHO MEAS - LV SYSTOLIC VOL/BSA (12-30): 11 CM2
BH CV ECHO MEAS - LV V1 MAX: 61.9 CM/SEC
BH CV ECHO MEAS - LV V1 VTI: 11.1 CM
BH CV ECHO MEAS - LVIDD: 5.1 CM
BH CV ECHO MEAS - LVIDS: 3.1 CM
BH CV ECHO MEAS - LVOT AREA: 3.7 CM2
BH CV ECHO MEAS - LVOT DIAM: 2.16 CM
BH CV ECHO MEAS - LVPWD: 0.92 CM
BH CV ECHO MEAS - MR MAX PG: 33.5 MMHG
BH CV ECHO MEAS - MR MAX VEL: 289.6 CM/SEC
BH CV ECHO MEAS - MV A MAX VEL: 92.3 CM/SEC
BH CV ECHO MEAS - MV DEC SLOPE: 419.2 CM/SEC2
BH CV ECHO MEAS - MV DEC TIME: 0.15 MSEC
BH CV ECHO MEAS - MV E MAX VEL: 61.6 CM/SEC
BH CV ECHO MEAS - MV E/A: 0.67
BH CV ECHO MEAS - MV MAX PG: 4.7 MMHG
BH CV ECHO MEAS - MV MEAN PG: 1.76 MMHG
BH CV ECHO MEAS - MV V2 VTI: 20.6 CM
BH CV ECHO MEAS - MVA(VTI): 1.98 CM2
BH CV ECHO MEAS - PA V2 MAX: 106.5 CM/SEC
BH CV ECHO MEAS - PI END-D VEL: 104 CM/SEC
BH CV ECHO MEAS - PULM A REVS DUR: 0.12 SEC
BH CV ECHO MEAS - PULM A REVS VEL: 44.4 CM/SEC
BH CV ECHO MEAS - PULM DIAS VEL: 48.4 CM/SEC
BH CV ECHO MEAS - PULM S/D: 1.51
BH CV ECHO MEAS - PULM SYS VEL: 73.2 CM/SEC
BH CV ECHO MEAS - RAP SYSTOLE: 3 MMHG
BH CV ECHO MEAS - RV MAX PG: 1.95 MMHG
BH CV ECHO MEAS - RV V1 MAX: 69.8 CM/SEC
BH CV ECHO MEAS - RV V1 VTI: 9.4 CM
BH CV ECHO MEAS - RVDD: 3.1 CM
BH CV ECHO MEAS - RVSP: 40.5 MMHG
BH CV ECHO MEAS - SI(MOD-SP4): 16.2 ML/M2
BH CV ECHO MEAS - SV(LVOT): 40.7 ML
BH CV ECHO MEAS - SV(MOD-SP4): 34.5 ML
BH CV ECHO MEAS - TR MAX PG: 37.5 MMHG
BH CV ECHO MEAS - TR MAX VEL: 305.7 CM/SEC
L PNEUMO1 AG UR QL IA: NEGATIVE
MAXIMAL PREDICTED HEART RATE: 141 BPM
S PNEUM AG SPEC QL LA: NEGATIVE
STRESS TARGET HR: 120 BPM

## 2022-07-26 PROCEDURE — 94799 UNLISTED PULMONARY SVC/PX: CPT

## 2022-07-26 PROCEDURE — 94761 N-INVAS EAR/PLS OXIMETRY MLT: CPT

## 2022-07-26 PROCEDURE — 87449 NOS EACH ORGANISM AG IA: CPT | Performed by: HOSPITALIST

## 2022-07-26 PROCEDURE — 25010000002 CEFTRIAXONE PER 250 MG: Performed by: HOSPITALIST

## 2022-07-26 PROCEDURE — 94664 DEMO&/EVAL PT USE INHALER: CPT

## 2022-07-26 PROCEDURE — 99232 SBSQ HOSP IP/OBS MODERATE 35: CPT | Performed by: INTERNAL MEDICINE

## 2022-07-26 PROCEDURE — 99239 HOSP IP/OBS DSCHRG MGMT >30: CPT | Performed by: HOSPITALIST

## 2022-07-26 PROCEDURE — 87899 AGENT NOS ASSAY W/OPTIC: CPT | Performed by: HOSPITALIST

## 2022-07-26 RX ORDER — HYDROCHLOROTHIAZIDE 12.5 MG/1
12.5 TABLET ORAL DAILY
Qty: 30 TABLET | Refills: 0 | Status: SHIPPED | OUTPATIENT
Start: 2022-07-27 | End: 2022-10-13

## 2022-07-26 RX ADMIN — LOSARTAN POTASSIUM 100 MG: 25 TABLET, FILM COATED ORAL at 08:11

## 2022-07-26 RX ADMIN — ARFORMOTEROL TARTRATE 15 MCG: 15 SOLUTION RESPIRATORY (INHALATION) at 07:43

## 2022-07-26 RX ADMIN — Medication 1000 UNITS: at 08:11

## 2022-07-26 RX ADMIN — AMLODIPINE BESYLATE 5 MG: 5 TABLET ORAL at 09:00

## 2022-07-26 RX ADMIN — Medication 10 ML: at 08:11

## 2022-07-26 RX ADMIN — BUDESONIDE 0.5 MG: 0.5 INHALANT RESPIRATORY (INHALATION) at 07:43

## 2022-07-26 RX ADMIN — CEFTRIAXONE SODIUM 1 G: 1 INJECTION, POWDER, FOR SOLUTION INTRAMUSCULAR; INTRAVENOUS at 03:11

## 2022-07-26 RX ADMIN — PANTOPRAZOLE SODIUM 40 MG: 40 TABLET, DELAYED RELEASE ORAL at 08:11

## 2022-07-26 RX ADMIN — HYDROCHLOROTHIAZIDE 12.5 MG: 12.5 TABLET ORAL at 08:11

## 2022-07-27 ENCOUNTER — READMISSION MANAGEMENT (OUTPATIENT)
Dept: CALL CENTER | Facility: HOSPITAL | Age: 82
End: 2022-07-27

## 2022-08-02 ENCOUNTER — READMISSION MANAGEMENT (OUTPATIENT)
Dept: CALL CENTER | Facility: HOSPITAL | Age: 82
End: 2022-08-02

## 2022-08-02 NOTE — OUTREACH NOTE
COPD/PN Week 1 Survey    Flowsheet Row Responses   [facility] patient discharged from? [place]   Does the patient have one of the following disease processes/diagnoses(primary or secondary)? COPD/Pneumonia   Was the primary reason for admission: Pneumonia  [RML pneumonia, COPD]   Week 1 attempt successful? No   Unsuccessful attempts Attempt 1          GEORGE BEJARANO - Registered Nurse

## 2022-08-05 ENCOUNTER — READMISSION MANAGEMENT (OUTPATIENT)
Dept: CALL CENTER | Facility: HOSPITAL | Age: 82
End: 2022-08-05

## 2022-08-05 NOTE — OUTREACH NOTE
COPD/PN Week 1 Survey    Flowsheet Row Responses   [facility] patient discharged from? [place]   Does the patient have one of the following disease processes/diagnoses(primary or secondary)? COPD/Pneumonia   Was the primary reason for admission: Pneumonia   Week 1 attempt successful? No   Unsuccessful attempts Attempt 2  [attempted pt and both contacts]          KD FELIX - Registered Nurse

## 2022-08-09 ENCOUNTER — READMISSION MANAGEMENT (OUTPATIENT)
Dept: CALL CENTER | Facility: HOSPITAL | Age: 82
End: 2022-08-09

## 2022-08-09 NOTE — OUTREACH NOTE
COPD/PN Week 1 Survey    Flowsheet Row Responses   [facility] patient discharged from? [place]   Does the patient have one of the following disease processes/diagnoses(primary or secondary)? COPD/Pneumonia   Was the primary reason for admission: Pneumonia   Week 1 attempt successful? Yes   Call start time 0841   Call end time 0845   Discharge diagnosis Pneumonia    Meds reviewed with patient/caregiver? Yes   Is the patient having any side effects they believe may be caused by any medication additions or changes? No   Does the patient have all medications ordered at discharge? Yes   Is the patient taking all medications as directed (includes completed medication regime)? Yes   Does the patient have a primary care provider?  Yes   Does the patient have an appointment with their PCP or specialist within 7 days of discharge? Yes   Comments regarding PCP 8/9/22   Has the patient kept scheduled appointments due by today? N/A   Has home health visited the patient within 72 hours of discharge? N/A   Pulse Ox monitoring Intermittent   Pulse Ox device source Patient   O2 Sat comments above 90   O2 Sat: education provided Sat levels, Monitoring frequency, When to seek care   Psychosocial issues? No   Did the patient receive a copy of their discharge instructions? Yes   Nursing interventions Reviewed instructions with patient   What is the patient's perception of their health status since discharge? Improving   Nursing Interventions Nurse provided patient education   Is the patient/caregiver able to teach back the hierarchy of who to call/visit for symptoms/problems? PCP, Specialist, Home health nurse, Urgent Care, ED, 911 Yes   Is the patient able to teach back COPD zones? Yes   Nursing interventions Education provided on various zones   Patient reports what zone on this call? Green Zone   Green Zone Reports doing well   Green Zone interventions: Take daily medications   Is the patient/caregiver able to teach back signs  and symptoms of worsening condition: Shortness of breath, Fever/chills, Chest pain   Is the patient/caregiver able to teach back importance of completing antibiotic course of treatment? Yes   Week 1 call completed? Yes   Wrap up additional comments Pt reports he is doing well and has fu appt with PCP this am.         Pt called back and states that appt with PCP was cancelled. Offered HUB number as Pt plans to find a different PCP. Pt unable to write number down at this time and will call call center back when he gets home and ask for the number.   ELENA ORDONEZ - Registered Nurse

## 2022-08-15 ENCOUNTER — READMISSION MANAGEMENT (OUTPATIENT)
Dept: CALL CENTER | Facility: HOSPITAL | Age: 82
End: 2022-08-15

## 2022-08-15 NOTE — OUTREACH NOTE
COPD/PN Week 2 Survey    Flowsheet Row Responses   [facility] patient discharged from? [place]   Does the patient have one of the following disease processes/diagnoses(primary or secondary)? COPD/Pneumonia   Was the primary reason for admission: Pneumonia   Week 2 attempt successful? Yes   Call start time 1429   Call end time 1436   Discharge diagnosis Pneumonia    Meds reviewed with patient/caregiver? Yes   Is the patient having any side effects they believe may be caused by any medication additions or changes? No   Is the patient taking all medications as directed (includes completed medication regime)? Yes   Does the patient have a primary care provider?  Yes   Does the patient have an appointment with their PCP or specialist within 7 days of discharge? --  [pt went to his appt on 8/9/22 @10am and was told he did not have an appt, he later received a call that he actually did have an appt. He is now trying to find another PCP]   What is preventing the patient from scheduling follow up appointments within 7 days of discharge? Unsure of when or with whom   Nursing Interventions Educated patient on importance of making appointment   Has the patient kept scheduled appointments due by today? N/A   Has home health visited the patient within 72 hours of discharge? N/A   Pulse Ox monitoring Intermittent   Pulse Ox device source Patient   O2 Sat comments 100% on RA   O2 Sat: education provided Sat levels, Monitoring frequency   Psychosocial issues? No   What is the patient's perception of their health status since discharge? Improving   Nursing Interventions Nurse provided patient education   Is the patient/caregiver able to teach back the hierarchy of who to call/visit for symptoms/problems? PCP, Specialist, Home health nurse, Urgent Care, ED, 911 Yes   Is the patient/caregiver able to teach back signs and symptoms of worsening condition: Fever/chills, Shortness of breath, Chest pain   Is the patient/caregiver able  to teach back importance of completing antibiotic course of treatment? Yes   Week 2 call completed? Yes          CONNER MELCHOR - Registered Nurse

## 2022-08-16 ENCOUNTER — APPOINTMENT (OUTPATIENT)
Dept: GENERAL RADIOLOGY | Facility: HOSPITAL | Age: 82
End: 2022-08-16

## 2022-08-16 ENCOUNTER — APPOINTMENT (OUTPATIENT)
Dept: CT IMAGING | Facility: HOSPITAL | Age: 82
End: 2022-08-16

## 2022-08-16 ENCOUNTER — HOSPITAL ENCOUNTER (EMERGENCY)
Facility: HOSPITAL | Age: 82
Discharge: HOME | End: 2022-08-16
Attending: EMERGENCY MEDICINE | Admitting: EMERGENCY MEDICINE

## 2022-08-16 VITALS
RESPIRATION RATE: 15 BRPM | BODY MASS INDEX: 27.72 KG/M2 | WEIGHT: 198 LBS | TEMPERATURE: 97.9 F | SYSTOLIC BLOOD PRESSURE: 142 MMHG | HEART RATE: 50 BPM | DIASTOLIC BLOOD PRESSURE: 69 MMHG | HEIGHT: 71 IN | OXYGEN SATURATION: 97 %

## 2022-08-16 DIAGNOSIS — S00.83XA CONTUSION OF FACE, INITIAL ENCOUNTER: ICD-10-CM

## 2022-08-16 DIAGNOSIS — S52.502A CLOSED FRACTURE OF DISTAL ENDS OF LEFT RADIUS AND ULNA, INITIAL ENCOUNTER: Primary | ICD-10-CM

## 2022-08-16 DIAGNOSIS — S61.411A LACERATION OF RIGHT HAND WITHOUT FOREIGN BODY, INITIAL ENCOUNTER: ICD-10-CM

## 2022-08-16 DIAGNOSIS — S61.412A LACERATION OF LEFT HAND, FOREIGN BODY PRESENCE UNSPECIFIED, INITIAL ENCOUNTER: ICD-10-CM

## 2022-08-16 DIAGNOSIS — S52.602A CLOSED FRACTURE OF DISTAL ENDS OF LEFT RADIUS AND ULNA, INITIAL ENCOUNTER: Primary | ICD-10-CM

## 2022-08-16 PROCEDURE — 71045 X-RAY EXAM CHEST 1 VIEW: CPT

## 2022-08-16 PROCEDURE — 70450 CT HEAD/BRAIN W/O DYE: CPT

## 2022-08-16 PROCEDURE — 73110 X-RAY EXAM OF WRIST: CPT

## 2022-08-16 PROCEDURE — 99283 EMERGENCY DEPT VISIT LOW MDM: CPT

## 2022-08-16 RX ORDER — HYDROCODONE BITARTRATE AND ACETAMINOPHEN 5; 325 MG/1; MG/1
1 TABLET ORAL ONCE AS NEEDED
Status: COMPLETED | OUTPATIENT
Start: 2022-08-16 | End: 2022-08-16

## 2022-08-16 RX ORDER — HYDROCODONE BITARTRATE AND ACETAMINOPHEN 5; 325 MG/1; MG/1
1 TABLET ORAL EVERY 6 HOURS PRN
Qty: 12 TABLET | Refills: 0 | Status: SHIPPED | OUTPATIENT
Start: 2022-08-16 | End: 2022-10-13

## 2022-08-16 RX ADMIN — HYDROCODONE BITARTRATE AND ACETAMINOPHEN 1 TABLET: 5; 325 TABLET ORAL at 21:07

## 2022-08-16 RX ADMIN — HYDROCODONE BITARTRATE AND ACETAMINOPHEN 1 TABLET: 5; 325 TABLET ORAL at 22:50

## 2022-08-17 NOTE — ED PROVIDER NOTES
Subjective   History of Present Illness  81-year-old male presents with pain to left wrist.  He was going up the stairs when he tripped and fell landing forward.  He struck his face.  He had no loss conscious.  No visual difficulty.  He has no complaints of neck or back pain.  He complains of moderate severe left wrist pain worse with movement.  Injury occurred shortly before arrival.  He complains of mild pain to chest.  No abdominal pain.  Review of Systems   Constitutional: Negative for fever and unexpected weight change.   HENT: Negative for congestion and sore throat.         Facial pain   Eyes: Negative for pain.   Respiratory: Negative for cough and shortness of breath.         Recently treated for pneumonia   Cardiovascular: Positive for chest pain. Negative for leg swelling.   Gastrointestinal: Negative for abdominal pain, diarrhea and vomiting.   Genitourinary: Negative for dysuria and urgency.   Musculoskeletal: Negative for back pain.        Left wrist pain   Skin: Positive for wound. Negative for rash.   Neurological: Negative for dizziness, weakness and headaches.   Psychiatric/Behavioral: Negative for confusion.       Past Medical History:   Diagnosis Date   • COPD (chronic obstructive pulmonary disease) ([facility])    • Hypertension    • 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[facility])        Allergies   Allergen Reactions   • Penicillins Unknown - High Severity       Past Surgical History:   Procedure Laterality Date   • APPENDECTOMY     • LUNG REMOVAL, PARTIAL     • SPINE SURGERY         No family history on file.    Social History     Socioeconomic History   • Marital status:    Tobacco Use   • Smoking status: Former Smoker   • Smokeless tobacco: Never Used   Vaping Use   • Vaping Use: Never used     Prior to Admission medications    Medication Sig Start Date End Date Taking? Authorizing Provider   albuterol sulfate  (90 Base) MCG/ACT inhaler Inhale 1 puff Every 4 (Four) Hours As Needed for Wheezing.    Provider,  MD Marni   amLODIPine (NORVASC) 5 MG tablet Take 1 tablet by mouth Daily. 7/24/22   Maciel Lozano MD   atorvastatin (LIPITOR) 20 MG tablet Take 20 mg by mouth Daily.    Marni Leija MD   azithromycin (ZITHROMAX) 250 MG tablet 1 po qd  Indications: Pneumonia 7/25/22   Maciel Lozano MD   benzonatate (TESSALON) 100 MG capsule Take 100 mg by mouth 3 (Three) Times a Day As Needed.    Marni Leija MD   cefdinir (OMNICEF) 300 MG capsule Take 1 capsule by mouth 2 (Two) Times a Day. 7/24/22   Maciel Lozano MD   cholecalciferol (VITAMIN D3) 25 MCG (1000 UT) tablet Take 1,000 Units by mouth Daily.    Marni Leija MD   Fluticasone-Umeclidin-Vilant (Trelegy Ellipta) 100-62.5-25 MCG/INH inhaler Inhale 1 puff Daily.    Marni Leija MD   hydroCHLOROthiazide (HYDRODIURIL) 12.5 MG tablet Take 1 tablet by mouth Daily for 30 days. 7/27/22 8/26/22  Laurie Perera MD   ipratropium-albuterol (DUO-NEB) 0.5-2.5 mg/3 ml nebulizer Take 3 mL by nebulization Every 4 (Four) Hours As Needed for Wheezing or Shortness of Air.    Marni Leija MD   irbesartan (AVAPRO) 300 MG tablet Take 300 mg by mouth Daily.    Marni Leija MD   omeprazole (priLOSEC) 20 MG capsule Take 20 mg by mouth Daily.    Marni Leija MD   Potassium 99 MG tablet Take 99 mg by mouth Daily.    Marni Leija MD           Objective   Physical Exam  41-year-old male awake alert.  Generally well-developed well-nourished.  Pupils equal round react light.  Extraocular muscles are intact he is noted to have bruising lateral to left eye.  There is mild wound that would not require repair.  He has no other significant facial bony tenderness.  He has no complaints of cervical thoracic lumbar spine tenderness.  Chest clear without significant rib tenderness.  Cardiovascular a rhythm.  Abdomen soft nontender peer examination of extremities he has superficial wound over volar aspect of right  thumb.  He has intact movement of right hand and wrist.  Examination of left arm no shoulder or elbow pain.  He is noted to have soft tissue swelling to wrist.  He has a wound to the volar aspect of thumb.  He is neuro vasc intact distally.  He has no complaints of hip or leg pain.  Neurologic exam Kerens Coma Scale 15 no focal findings  Laceration Repair    Date/Time: 8/16/2022 10:23 PM  Performed by: German Marroquin MD  Authorized by: German Marroquin MD     Consent:     Consent obtained:  Verbal    Consent given by:  Patient  Universal protocol:     Immediately prior to procedure, a time out was called: yes      Patient identity confirmed:  Verbally with patient  Anesthesia:     Anesthesia method:  Local infiltration    Local anesthetic:  Lidocaine 1% WITH epi  Laceration details:     Location:  Hand    Hand location:  R palm    Length (cm):  1.5  Exploration:     Wound exploration: entire depth of wound visualized      Contaminated: no    Treatment:     Area cleansed with:  Chlorhexidine    Irrigation solution:  Sterile saline  Skin repair:     Repair method:  Sutures    Suture size:  5-0    Suture material:  Plain gut  Post-procedure details:     Dressing:  Antibiotic ointment  Comments:      Patient's wound at the base of the thumb was noted to be at 1.5 cm superficial flap type wound.  This was repaired with 5-0 plain gut suture dressed with bacitracin and gauze dressing  Splint - Cast - Strapping    Date/Time: 8/16/2022 10:25 PM  Performed by: German Marroquin MD  Authorized by: German Marroquin MD     Procedure details:     Location:  Wrist    Cast type:  Short arm    Supplies:  Plaster  Post-procedure details:     Distal neurologic exam:  Normal    Distal perfusion: unchanged      Procedure completion:  Tolerated well, no immediate complications               ED Course        XR Wrist 3+ View Left    Result Date: 8/16/2022  Intra-articular comminuted impacted mildly displaced distal radial  "metaphyseal fracture. Mildly displaced ulnar styloid process fracture. 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  Electronically Signed By-Daksha Pierson MD On:8/16/2022 9:16 PM This report was finalized on [id number] by  Daksha Pierson MD.    CT Head Without Contrast    Result Date: 8/16/2022  1.     No acute intracranial abnormality on head CT. 2.     Moderate left frontal scalp hematoma. 3.     Mild volume loss and mild amount of low-density in the periventricular and subcortical white matter which is most commonly seen with chronic small vessel ischemic change. 4.     Brain MRI is more sensitive to evaluate for acute or subacute infarcts and to evaluate for intracranial metastatic disease.  Electronically Signed By-Daksha Pierson MD On:8/16/2022 9:13 PM This report was finalized on [id number] by  Daksha Pierson MD.    XR Chest 1 View    Result Date: 8/16/2022  Chronic changes in the chest.. The opacity in the right infrahilar location appears slightly improved.  Electronically Signed By-Daksha Pierson MD On:8/16/2022 9:38 PM This report was finalized on [id number] by  Daksha Pierson MD.    Medications   HYDROcodone-acetaminophen (NORCO) 5-325 MG per tablet 1 tablet (has no administration in time range)   HYDROcodone-acetaminophen (NORCO) 5-325 MG per tablet 1 tablet (1 tablet Oral Given 8/16/22 2107)     /69   Pulse 50   Temp 97.9 °F (36.6 °C) (Oral)   Resp 15   Ht 180.3 cm (71\")   Wt 89.8 kg (198 lb)   SpO2 97%   BMI 27.62 kg/m²                                        MDM  Chart review: He was discharged on the 26th of last month after admission for right middle lobe pneumonia.  Comorbidity: As per past history  Differential: Facial contusion, fracture, wrist fracture, laceration, closed head injury,  My EKG interpretation:   Lab:   Radiology: CT scan of head no acute intracranial abnormality noted.  No orbital fracture.  Left wrist reveals intra-articular comminuted impacted " mildly displaced distal radial fracture with mildly displaced ulnar styloid process fracture.  Chest x-ray reveals some chronic changes.  The opacity in the right infrahilar location appears slightly improved from previous.  Discussion/treatment: Patient was given Norco for pain.  He reports his tetanus immunization is current.  Patient was discussed with Dr. Ruiz he is to follow-up with him in the office in the next few days.  He reports office will contact him.  Advised to ice elevate wrist.  Patient has a ring on his ring finger.  It is not tight but does not come over his knuckle.  He was advised to watch this to make sure this finger does not swell in which case it would need to be cut off.  His inspect report was reviewed he was given Norco for pain.  Patient was evaluated using appropriate PPE      Final diagnoses:   Closed fracture of distal ends of left radius and ulna, initial encounter   Contusion of face, initial encounter   Laceration of left hand, foreign body presence unspecified, initial encounter   Laceration of right hand without foreign body, initial encounter       ED Disposition  ED Disposition     ED Disposition   Discharge    Condition   Stable    Comment   --             Yoan Ruiz MD  [facility] [place] [id number] [phone]    Schedule an appointment as soon as possible for a visit       PATIENT CONNECTION - [facility] [address] [phone]             Medication List      New Prescriptions    HYDROcodone-acetaminophen 5-325 MG per tablet  Commonly known as: Norco  Take 1 tablet by mouth Every 6 (Six) Hours As Needed for Moderate Pain .           Where to Get Your Medications      These medications were sent to [id number] - [place], [place] - [address] - [phone]  - [phone]   [address] [place] [address]    Phone: [phone]   · HYDROcodone-acetaminophen 5-325 MG per tablet          Cara  German PRINCE MD  08/16/22 4977

## 2022-08-17 NOTE — DISCHARGE INSTRUCTIONS
Rest ice elevate wrist, if ring becomes tight will need to return to have a cut off.  Apply antibiotic ointment to abrasions.  May use ice to sore areas.  Return new or worsening symptoms.

## 2022-08-24 ENCOUNTER — READMISSION MANAGEMENT (OUTPATIENT)
Dept: CALL CENTER | Facility: HOSPITAL | Age: 82
End: 2022-08-24

## 2022-08-24 NOTE — OUTREACH NOTE
COPD/PN Week 3 Survey    Flowsheet Row Responses   [facility] patient discharged from? [place]   Does the patient have one of the following disease processes/diagnoses(primary or secondary)? COPD/Pneumonia   Was the primary reason for admission: Pneumonia   Week 3 attempt successful? Yes   Call start time 1107   Call end time 1112   Meds reviewed with patient/caregiver? Yes   Is the patient having any side effects they believe may be caused by any medication additions or changes? No   Does the patient have all medications ordered at discharge? Yes   Is the patient taking all medications as directed (includes completed medication regime)? Yes   Does the patient have a primary care provider?  No   PCP Nursing Intervention --  [Declined any assistance with finding a PCP.]   Does the patient have an appointment with their PCP or specialist within 7 days of discharge? No   What is preventing the patient from scheduling follow up appointments within 7 days of discharge? Unsure of when or with whom   Nursing Interventions Advised patient to make appointment, Educated patient on importance of making appointment   Has the patient kept scheduled appointments due by today? Yes   Comments States kept his orthopedic appt today.   Pulse Ox monitoring Intermittent   Pulse Ox device source Patient   O2 Sat comments % on RA.   Psychosocial issues? No   What is the patient's perception of their health status since discharge? Improving   Nursing Interventions Nurse provided patient education   Is the patient/caregiver able to teach back the hierarchy of who to call/visit for symptoms/problems? PCP, Specialist, Home health nurse, Urgent Care, ED, 911 Yes   Is the patient able to teach back COPD zones? Yes   Nursing interventions Education provided on various zones   Patient reports what zone on this call? Green Zone   Green Zone Reports doing well, Sleeping well, Appetite is good, Breathing without shortness of breath, Usual  amount of phlegm/mucus without difficulty coughing up   Green Zone interventions: Take daily medications, Continue regular exercise/diet plan, Avoid indoor/outdoor triggers   Week 3 call completed? Yes   Revoked No further contact(revokes)-requires comment   Is the patient interested in additional calls from an ambulatory ?  NOTE:  applies to high risk patients requiring additional follow-up. No   Graduated/Revoked comments States is doing well-declined any further f/u calls.   Wrap up additional comments States is doing well regarding his breathing. States tripped over a rug and fell on 08/16/22-has wrist fracture and wearing cast. Denies any needs today.          GERDA MURPHY - Registered Nurse

## 2022-10-12 ENCOUNTER — TREATMENT (OUTPATIENT)
Dept: PHYSICAL THERAPY | Facility: CLINIC | Age: 82
End: 2022-10-12

## 2022-10-12 ENCOUNTER — TRANSCRIBE ORDERS (OUTPATIENT)
Dept: PHYSICAL THERAPY | Facility: CLINIC | Age: 82
End: 2022-10-12

## 2022-10-12 DIAGNOSIS — M25.532 PAIN IN LEFT WRIST: ICD-10-CM

## 2022-10-12 DIAGNOSIS — S62.102D CLOSED FRACTURE OF LEFT WRIST WITH ROUTINE HEALING, SUBSEQUENT ENCOUNTER: Primary | ICD-10-CM

## 2022-10-12 PROCEDURE — 97535 SELF CARE MNGMENT TRAINING: CPT | Performed by: PHYSICAL THERAPIST

## 2022-10-12 PROCEDURE — 97162 PT EVAL MOD COMPLEX 30 MIN: CPT | Performed by: PHYSICAL THERAPIST

## 2022-10-12 NOTE — PROGRESS NOTES
Physical Therapy Initial Evaluation and Plan of Care    Patient:  Christopher Talbert   :  1940  Diagnosis/ICD-10 Code:  Closed fracture of left wrist with routine healing, subsequent encounter [S62.102D]  Referring practitioner:  No ref. provider found  Date of Initial Visit:  10/12/2022  Today's Date:  10/12/2022  Patient seen for 1 session         Visit Diagnoses:      ICD-10-CM ICD-9-CM   1. Closed fracture of left wrist with routine healing, subsequent encounter  S62.102D V54.19   2. 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    Subjective Questionnaire:  Quick DASH = 68% disability.    Subjective Evaluation    History of Present Illness  Mechanism of injury: PT referral for:  S62.102A (ICD-10-CM) - Left wrist fracture.    Pt presents today with:  Intermittent pain in L wrist on dorsal and volar sides.  Denies numbness/tingling or sharp/shooting pains down either UE.  Denies neck pain/dysfunction; therefore, per time this area will not be assessed today; will assess in a subsequent session PRN.    Symptoms began:  Injury occurred around 2022 after falling on the lip of a stair; no loss of consciousness.  Had stitches on L thumb web space after injury.  No wrist surgery.  Was placed in hard cast for 2-3 weeks post-injury then placed in wrist brace he is wearing today; pt instructed to wear brace PRN, but he wears it often to prevent swelling.  Pt also sustained a rib fx and facial laceration during the fall.    Functional deficits:  Pain with lifting a cell phone with LUE (even with brace on) and unable to open a jar.    Independent in bathing, grooming, dressing, and driving.    Occupation:  Retired.    Imagin2022  XR WRIST 3+ VW LEFT-  IMPRESSION:  Intra-articular comminuted impacted mildly displaced distal radial  metaphyseal  fracture. Mildly displaced ulnar styloid process fracture.  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    Denies changes in bowel/bladder function, dizziness/vision issues, or other systemic problems since onset of symptoms.    Quality of life: good    Pain  Current pain rating: 3  At worst pain ratin  Relieving factors: change in position, relaxation, rest, support and medications    Hand dominance: right           Vitals in sitting after subjective hx-taking in RUE:  BP:  154/76 mmHg.  HR:  42 bpm. (per machine; manual reading in LUE = 42 bpm)  O2:  98%.  No reports of dizziness/fatigue/confusion/loss of balance/unsteadiness per low HR; will monitor closely in subsequent sessions.  Pt does admit to feeling tired and fatigued over the last 5-6 months, but not feeling fatigued today.  Pt reports he has a pulse ox at home; therapist instructed pt/family to monitor closely at home.  Pt just recently moved to the area from out of town and he does not have a PCP or cardiologist in this area yet.  Therapist called referring provider's office while pt was still in the office to express concerns for low HR since pt has not had this low of a HR to his knowledge before.  MD office spoke with referring provider who stated we use [facility]'s vitals guidelines for treatment and/or have pt seek emergency care.  Therapist instructed pt of MD's recommendation.  Pt and his family who were present during today's session will determine whether they will go to Urgent Care / ER then will call our office back to relay medical clearance to return to PT to schedule subsequent sessions.    Objective          Static Posture     Head  Forward.    Shoulders  Rounded.    Observations     Left Wrist/Hand   Positive for edema.           Assessment & Plan     Assessment    Assessment details: PT referral for:  S62.102A (ICD-10-CM) - Left wrist fracture.    Pt presents today with the following impairments:  --   Subjective questionnaire score (Quick DASH).  --  Pt reports LUE strength/mobility.  --  Edema.  --  Posture.  --  Balance per hx of recent fall.    The above impairments contribute to the following functional deficits:  Pain with lifting a cell phone with LUE (even with brace on) and unable to open a jar.    The pt would benefit from skilled PT sessions to address impairments noted above in order to improve functional mobility and quality of life.    Objective measures not taken today per status of pt's low HR and per time (since therapist took time to call pt's referring provider while he was still in the office.)    No complications noted during today's session.  Prognosis: good  Prognosis details: Potential barriers to therapy that may impede prognosis:  Unstable/low HR at rest, significant and complex PMHx, high falls risk with hx of recent fall.    Goals  Plan Goals: STGs to be met in 4 weeks:  --  Obtain objective measures for ROM/strength/edema at first follow-up visit after eval (and adjust/add to goals accordingly) pending HR is in stable range/medical clearance.  --  Require </= 3 cues with HEP performance.  --  Pain levels at worst </= 4/10.  --  Pt to reduce frequency of wearing brace to 50% of time per MD instruction without complications.  --  Quick DASH score </= 50% disability.  --  Be able to lift cell phone with LUE without pain and no brace.    LTGs to be met by end of POC:  --  Pt to completely d/c use of brace per MD approval without complications.  --  Require no cues with HEP performance for d/c planning.  --  Pain levels at worst </= 2/10.  --  Quick DASH score </= 50% disability.  --  Be able to open jar with BUEs without L wrist pain and no brace.  --  Edema measures of LUE = to that of RUE.    Plan  Therapy options: will be seen for skilled therapy services  Planned modality interventions: cryotherapy, dry needling, electrical stimulation/Russian stimulation, TENS, thermotherapy  (hydrocollator packs) and ultrasound  Planned therapy interventions: abdominal trunk stabilization, ADL retraining, balance/weight-bearing training, flexibility, functional ROM exercises, home exercise program, joint mobilization, manual therapy, neuromuscular re-education, soft tissue mobilization, spinal/joint mobilization, strengthening, stretching and therapeutic activities  Frequency: 2x week  Duration in weeks: 13  Treatment plan discussed with: patient  Plan details: Obtain objective measures for ROM/strength/edema at first follow-up visit after eval (and adjust/add to goals accordingly) pending HR is in stable range/medical clearance.  Assess balance/palpation PRN.  Initiate there-ex and distribute HEP as able.        History # of Personal Factors and/or Comorbidities: HIGH (3+)  Examination of Body System(s): # of elements: MODERATE (3)  Clinical Presentation: EVOLVING  Clinical Decision Making: MODERATE      Timed:    Self Care                       11     mins   [id number]      Un-Timed:  Mod Eval     36     Mins  [id number];        Timed Treatment:   11   mins   Total Treatment:     47   mins      PT SIGNATURE:  Abrahan Gallego, PT   [place] PT License #:  [id number]  DATE TREATMENT INITIATED:  10/12/2022    Medicare Initial Certification  Certification Period: 10/12/2022 thru 1/9/2023  I certify that the therapy services are furnished while this patient is under my care.  The services outlined above are required by this patient, and will be reviewed every 90 days.      Physician Signature: ________________________________________    PHYSICIAN:         DATE: ____________________________________________________    Please sign and return via fax to [phone]. Thank you, [facility].

## 2022-10-13 ENCOUNTER — HOSPITAL ENCOUNTER (OUTPATIENT)
Facility: HOSPITAL | Age: 82
Setting detail: OBSERVATION
Discharge: HOME | End: 2022-10-15
Attending: EMERGENCY MEDICINE | Admitting: HOSPITALIST

## 2022-10-13 ENCOUNTER — APPOINTMENT (OUTPATIENT)
Dept: CARDIOLOGY | Facility: HOSPITAL | Age: 82
End: 2022-10-13

## 2022-10-13 ENCOUNTER — APPOINTMENT (OUTPATIENT)
Dept: GENERAL RADIOLOGY | Facility: HOSPITAL | Age: 82
End: 2022-10-13

## 2022-10-13 DIAGNOSIS — R00.1 BRADYCARDIA WITH 41-50 BEATS PER MINUTE: Primary | ICD-10-CM

## 2022-10-13 DIAGNOSIS — R60.0 EDEMA OF LEFT UPPER ARM: ICD-10-CM

## 2022-10-13 DIAGNOSIS — R42 DIZZY SPELLS: ICD-10-CM

## 2022-10-13 LAB
ALBUMIN SERPL-MCNC: 3.8 G/DL (ref 3.5–5.2)
ALBUMIN/GLOB SERPL: 1.6 G/DL
ALP SERPL-CCNC: 108 U/L (ref 39–117)
ALT SERPL W P-5'-P-CCNC: 11 U/L (ref 1–41)
ANION GAP SERPL CALCULATED.3IONS-SCNC: 9 MMOL/L (ref 5–15)
AST SERPL-CCNC: 19 U/L (ref 1–40)
BASOPHILS # BLD AUTO: 0.1 10*3/MM3 (ref 0–0.2)
BASOPHILS NFR BLD AUTO: 1.2 % (ref 0–1.5)
BILIRUB SERPL-MCNC: 0.6 MG/DL (ref 0–1.2)
BUN SERPL-MCNC: 11 MG/DL (ref 8–23)
BUN/CREAT SERPL: 9.9 (ref 7–25)
CALCIUM SPEC-SCNC: 8.4 MG/DL (ref 8.6–10.5)
CHLORIDE SERPL-SCNC: 106 MMOL/L (ref 98–107)
CO2 SERPL-SCNC: 25 MMOL/L (ref 22–29)
CREAT SERPL-MCNC: 1.11 MG/DL (ref 0.76–1.27)
D DIMER PPP FEU-MCNC: 2.75 MG/L (FEU) (ref 0–0.59)
DEPRECATED RDW RBC AUTO: 48.1 FL (ref 37–54)
EGFRCR SERPLBLD CKD-EPI 2021: 67.5 ML/MIN/1.73
EOSINOPHIL # BLD AUTO: 0.3 10*3/MM3 (ref 0–0.4)
EOSINOPHIL NFR BLD AUTO: 4 % (ref 0.3–6.2)
ERYTHROCYTE [DISTWIDTH] IN BLOOD BY AUTOMATED COUNT: 15 % (ref 12.3–15.4)
GLOBULIN UR ELPH-MCNC: 2.4 GM/DL
GLUCOSE SERPL-MCNC: 94 MG/DL (ref 65–99)
HCT VFR BLD AUTO: 40 % (ref 37.5–51)
HGB BLD-MCNC: 13.5 G/DL (ref 13–17.7)
LYMPHOCYTES # BLD AUTO: 2.2 10*3/MM3 (ref 0.7–3.1)
LYMPHOCYTES NFR BLD AUTO: 30.7 % (ref 19.6–45.3)
MCH RBC QN AUTO: 30.6 PG (ref 26.6–33)
MCHC RBC AUTO-ENTMCNC: 33.8 G/DL (ref 31.5–35.7)
MCV RBC AUTO: 90.5 FL (ref 79–97)
MONOCYTES # BLD AUTO: 0.3 10*3/MM3 (ref 0.1–0.9)
MONOCYTES NFR BLD AUTO: 4.9 % (ref 5–12)
NEUTROPHILS NFR BLD AUTO: 4.2 10*3/MM3 (ref 1.7–7)
NEUTROPHILS NFR BLD AUTO: 59.2 % (ref 42.7–76)
NRBC BLD AUTO-RTO: 0 /100 WBC (ref 0–0.2)
NT-PROBNP SERPL-MCNC: 436.6 PG/ML (ref 0–1800)
NT-PROBNP SERPL-MCNC: 505.8 PG/ML (ref 0–1800)
PLATELET # BLD AUTO: 206 10*3/MM3 (ref 140–450)
PMV BLD AUTO: 8.8 FL (ref 6–12)
POTASSIUM SERPL-SCNC: 3.8 MMOL/L (ref 3.5–5.2)
PROT SERPL-MCNC: 6.2 G/DL (ref 6–8.5)
RBC # BLD AUTO: 4.42 10*6/MM3 (ref 4.14–5.8)
SODIUM SERPL-SCNC: 140 MMOL/L (ref 136–145)
TROPONIN T SERPL-MCNC: <0.01 NG/ML (ref 0–0.03)
WBC NRBC COR # BLD: 7.2 10*3/MM3 (ref 3.4–10.8)

## 2022-10-13 PROCEDURE — 83880 ASSAY OF NATRIURETIC PEPTIDE: CPT | Performed by: EMERGENCY MEDICINE

## 2022-10-13 PROCEDURE — 96372 THER/PROPH/DIAG INJ SC/IM: CPT

## 2022-10-13 PROCEDURE — 80053 COMPREHEN METABOLIC PANEL: CPT | Performed by: NURSE PRACTITIONER

## 2022-10-13 PROCEDURE — G0378 HOSPITAL OBSERVATION PER HR: HCPCS

## 2022-10-13 PROCEDURE — 25010000002 ENOXAPARIN PER 10 MG: Performed by: NURSE PRACTITIONER

## 2022-10-13 PROCEDURE — 85025 COMPLETE CBC W/AUTO DIFF WBC: CPT | Performed by: NURSE PRACTITIONER

## 2022-10-13 PROCEDURE — 71045 X-RAY EXAM CHEST 1 VIEW: CPT

## 2022-10-13 PROCEDURE — 85379 FIBRIN DEGRADATION QUANT: CPT | Performed by: NURSE PRACTITIONER

## 2022-10-13 PROCEDURE — 84484 ASSAY OF TROPONIN QUANT: CPT | Performed by: NURSE PRACTITIONER

## 2022-10-13 PROCEDURE — 99285 EMERGENCY DEPT VISIT HI MDM: CPT

## 2022-10-13 PROCEDURE — 93005 ELECTROCARDIOGRAM TRACING: CPT | Performed by: EMERGENCY MEDICINE

## 2022-10-13 PROCEDURE — 83880 ASSAY OF NATRIURETIC PEPTIDE: CPT | Performed by: NURSE PRACTITIONER

## 2022-10-13 RX ORDER — CHOLECALCIFEROL (VITAMIN D3) 125 MCG
5 CAPSULE ORAL NIGHTLY PRN
Status: DISCONTINUED | OUTPATIENT
Start: 2022-10-13 | End: 2022-10-15 | Stop reason: HOSPADM

## 2022-10-13 RX ORDER — ACETAMINOPHEN 160 MG/5ML
650 SOLUTION ORAL EVERY 4 HOURS PRN
Status: DISCONTINUED | OUTPATIENT
Start: 2022-10-13 | End: 2022-10-15 | Stop reason: HOSPADM

## 2022-10-13 RX ORDER — PROPRANOLOL HYDROCHLORIDE 10 MG/1
10 TABLET ORAL 2 TIMES DAILY
COMMUNITY
End: 2022-10-15 | Stop reason: HOSPADM

## 2022-10-13 RX ORDER — HYDROCHLOROTHIAZIDE 12.5 MG/1
12.5 TABLET ORAL ONCE
Status: COMPLETED | OUTPATIENT
Start: 2022-10-13 | End: 2022-10-13

## 2022-10-13 RX ORDER — ACETAMINOPHEN 325 MG/1
650 TABLET ORAL EVERY 4 HOURS PRN
Status: DISCONTINUED | OUTPATIENT
Start: 2022-10-13 | End: 2022-10-15 | Stop reason: HOSPADM

## 2022-10-13 RX ORDER — ENOXAPARIN SODIUM 100 MG/ML
1 INJECTION SUBCUTANEOUS ONCE
Status: COMPLETED | OUTPATIENT
Start: 2022-10-13 | End: 2022-10-13

## 2022-10-13 RX ORDER — NITROGLYCERIN 0.4 MG/1
0.4 TABLET SUBLINGUAL
Status: DISCONTINUED | OUTPATIENT
Start: 2022-10-13 | End: 2022-10-15 | Stop reason: HOSPADM

## 2022-10-13 RX ORDER — ONDANSETRON 4 MG/1
4 TABLET, FILM COATED ORAL EVERY 6 HOURS PRN
Status: DISCONTINUED | OUTPATIENT
Start: 2022-10-13 | End: 2022-10-15 | Stop reason: HOSPADM

## 2022-10-13 RX ORDER — ONDANSETRON 2 MG/ML
4 INJECTION INTRAMUSCULAR; INTRAVENOUS EVERY 6 HOURS PRN
Status: DISCONTINUED | OUTPATIENT
Start: 2022-10-13 | End: 2022-10-15 | Stop reason: HOSPADM

## 2022-10-13 RX ORDER — SODIUM CHLORIDE 0.9 % (FLUSH) 0.9 %
3 SYRINGE (ML) INJECTION EVERY 12 HOURS SCHEDULED
Status: DISCONTINUED | OUTPATIENT
Start: 2022-10-13 | End: 2022-10-15 | Stop reason: HOSPADM

## 2022-10-13 RX ORDER — HYDROCHLOROTHIAZIDE 12.5 MG/1
25 TABLET ORAL DAILY
COMMUNITY
End: 2023-02-10 | Stop reason: SDUPTHER

## 2022-10-13 RX ORDER — AMLODIPINE BESYLATE 5 MG/1
5 TABLET ORAL ONCE
Status: COMPLETED | OUTPATIENT
Start: 2022-10-13 | End: 2022-10-13

## 2022-10-13 RX ORDER — SODIUM CHLORIDE 0.9 % (FLUSH) 0.9 %
3-10 SYRINGE (ML) INJECTION AS NEEDED
Status: DISCONTINUED | OUTPATIENT
Start: 2022-10-13 | End: 2022-10-15 | Stop reason: HOSPADM

## 2022-10-13 RX ORDER — SODIUM CHLORIDE 0.9 % (FLUSH) 0.9 %
10 SYRINGE (ML) INJECTION AS NEEDED
Status: DISCONTINUED | OUTPATIENT
Start: 2022-10-13 | End: 2022-10-15 | Stop reason: HOSPADM

## 2022-10-13 RX ORDER — ACETAMINOPHEN 650 MG/1
650 SUPPOSITORY RECTAL EVERY 4 HOURS PRN
Status: DISCONTINUED | OUTPATIENT
Start: 2022-10-13 | End: 2022-10-15 | Stop reason: HOSPADM

## 2022-10-13 RX ADMIN — ACETAMINOPHEN 650 MG: 325 TABLET, FILM COATED ORAL at 21:38

## 2022-10-13 RX ADMIN — ENOXAPARIN SODIUM 90 MG: 100 INJECTION SUBCUTANEOUS at 20:17

## 2022-10-13 RX ADMIN — AMLODIPINE BESYLATE 5 MG: 5 TABLET ORAL at 20:17

## 2022-10-13 RX ADMIN — HYDROCHLOROTHIAZIDE 12.5 MG: 12.5 TABLET ORAL at 19:12

## 2022-10-13 RX ADMIN — Medication 3 ML: at 21:00

## 2022-10-13 NOTE — ED PROVIDER NOTES
Subjective   History of Present Illness  Patient is a 81 year old male who recently moved here in July of this year and has not been able to establish cardiology or primary care.  Comes in today stating that he had broken his left arm and was sent to physical therapy he states he went to physical therapy yesterday and they told him they could not treat him because his heart rate was in the 30s.  He was advised to come to the emergency room yesterday but presents to the ER today with dizziness and lightheadedness as well as some shortness of breath associated with the low heart rate.  He states this has been happening on and off for several months now but has gradually gotten worse.  He denies any chest pain at the time of my exam.        Review of Systems   Constitutional: Negative for chills, fatigue and fever.   HENT: Negative for congestion, tinnitus and trouble swallowing.    Eyes: Negative for photophobia, discharge and redness.   Respiratory: Negative for cough and shortness of breath.    Cardiovascular: Negative for chest pain and palpitations.        Reported bradycardia   Gastrointestinal: Negative for abdominal pain, diarrhea, nausea and vomiting.   Genitourinary: Negative for dysuria, frequency and urgency.   Musculoskeletal: Negative for back pain, joint swelling and myalgias.   Skin: Negative for rash.   Neurological: Positive for dizziness and light-headedness. Negative for headaches.   Psychiatric/Behavioral: Negative for confusion.   All other systems reviewed and are negative.      Past Medical History:   Diagnosis Date   • COPD (chronic obstructive pulmonary disease) (HCC)    • Hypertension    • 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HCC)        Allergies   Allergen Reactions   • Penicillins Unknown - High Severity       Past Surgical History:   Procedure Laterality Date   • APPENDECTOMY     • LUNG REMOVAL, PARTIAL     • SPINE SURGERY         History reviewed. No pertinent family history.    Social History      Socioeconomic History   • Marital status:    Tobacco Use   • Smoking status: Former   • Smokeless tobacco: Never   Vaping Use   • Vaping Use: Never used           Objective   Physical Exam  Vitals reviewed.   Constitutional:       General: He is not in acute distress.     Appearance: Normal appearance. He is well-developed and normal weight. He is not ill-appearing or toxic-appearing.   HENT:      Head: Normocephalic and atraumatic.      Right Ear: External ear normal.      Left Ear: External ear normal.      Nose: Nose normal.      Mouth/Throat:      Mouth: Mucous membranes are moist.   Eyes:      Conjunctiva/sclera: Conjunctivae normal.      Pupils: Pupils are equal, round, and reactive to light.   Cardiovascular:      Rate and Rhythm: Regular rhythm. Bradycardia present.      Pulses: Normal pulses.      Heart sounds: Normal heart sounds.   Pulmonary:      Effort: Pulmonary effort is normal.      Breath sounds: Normal breath sounds.   Abdominal:      General: Bowel sounds are normal.      Palpations: Abdomen is soft.   Musculoskeletal:      Right wrist: Normal.      Left wrist: Swelling and tenderness present. Decreased range of motion.        Arms:       Cervical back: Normal range of motion and neck supple.      Comments: There is still tenderness with range of motion at the left wrist from fracture in August 2022     Skin:     General: Skin is warm and dry.      Capillary Refill: Capillary refill takes 2 to 3 seconds.   Neurological:      General: No focal deficit present.      Mental Status: He is alert and oriented to person, place, and time. Mental status is at baseline.      GCS: GCS eye subscore is 4. GCS verbal subscore is 5. GCS motor subscore is 6.   Psychiatric:         Attention and Perception: Attention normal.         Mood and Affect: Mood normal.         Speech: Speech normal.         Behavior: Behavior normal. Behavior is cooperative.         Cognition and Memory: Cognition normal.  "        Procedures       EKG-shows sinus bradycardia with a rate of 44 there is a right bundle branch block present which was present on the previous dated 7/20/2022 these were reviewed by myself and read by Dr. Otoole    ED Course  ED Course as of 10/13/22 1933   u Oct 13, 2022   1933 Patient was discussed with Lucy fritz nurse practitioner with the hospitalist and admitted to Dr. Justin [KW]      ED Course User Index  [KW] FortunatoDeidre D, APRN      BP (!) 193/83   Pulse (!) 49   Temp 98 °F (36.7 °C) (Oral)   Resp 14   Ht 180.3 cm (71\")   Wt 88.5 kg (195 lb)   SpO2 98%   BMI 27.20 kg/m²   Labs Reviewed   COMPREHENSIVE METABOLIC PANEL - Abnormal; Notable for the following components:       Result Value    Calcium 8.4 (*)     All other components within normal limits    Narrative:     GFR Normal >60  Chronic Kidney Disease <60  Kidney Failure <15     CBC WITH AUTO DIFFERENTIAL - Abnormal; Notable for the following components:    Monocyte % 4.9 (*)     All other components within normal limits   D-DIMER, QUANTITATIVE - Abnormal; Notable for the following components:    D-Dimer, Quantitative 2.75 (*)     All other components within normal limits    Narrative:     Reference Range  --------------------------------------------------------------------     < 0.50   Negative Predictive Value  0.50-0.59   Indeterminate    >= 0.60   Probable VTE             A very low percentage of patients with DVT may yield D-Dimer results   below the cut-off of 0.50 mg/L FEU.  This is known to be more   prevalent in patients with distal DVT.             Results of this test should always be interpreted in conjunction with   the patient's medical history, clinical presentation and other   findings.  Clinical diagnosis should not be based on the result of   INNOVANCE D-Dimer alone.   BNP (IN-HOUSE) - Normal    Narrative:     Among patients with dyspnea, NT-proBNP is highly sensitive for the detection of acute congestive heart " failure. In addition NT-proBNP of <300 pg/ml effectively rules out acute congestive heart failure with 99% negative predictive value.    Results may be falsely decreased if patient taking Biotin.     TROPONIN (IN-HOUSE) - Normal    Narrative:     Troponin T Reference Range:  <= 0.03 ng/mL-   Negative for AMI  >0.03 ng/mL-     Abnormal for myocardial necrosis.  Clinicians would have to utilize clinical acumen, EKG, Troponin and serial changes to determine if it is an Acute Myocardial Infarction or myocardial injury due to an underlying chronic condition.       Results may be falsely decreased if patient taking Biotin.     BNP (IN-HOUSE) - Normal    Narrative:     Among patients with dyspnea, NT-proBNP is highly sensitive for the detection of acute congestive heart failure. In addition NT-proBNP of <300 pg/ml effectively rules out acute congestive heart failure with 99% negative predictive value.    Results may be falsely decreased if patient taking Biotin.     CBC AND DIFFERENTIAL    Narrative:     The following orders were created for panel order CBC & Differential.  Procedure                               Abnormality         Status                     ---------                               -----------         ------                     CBC Auto Differential[[id number]]        Abnormal            Final result                 Please view results for these tests on the individual orders.     Medications   sodium chloride 0.9 % flush 10 mL (has no administration in time range)   amLODIPine (NORVASC) tablet 5 mg (has no administration in time range)   hydroCHLOROthiazide (HYDRODIURIL) tablet 12.5 mg (12.5 mg Oral Given 10/13/22 1912)     XR Chest 1 View    Result Date: 10/13/2022  Cardiomegaly.  Electronically Signed By-Alfredo Salinas MD On:10/13/2022 4:22 PM This report was finalized on [id number] by  Alfredo Salinas MD.                                         MDM  Number of Diagnoses or Management Options  Bradycardia  with 41-50 beats per minute  Dizzy spells  Edema of left upper arm  Diagnosis management comments: Chart review  DATE OF EXAM:  8/16/2022 9:03 PM     PROCEDURE:  XR WRIST 3+ VW LEFT-     INDICATIONS:  Fall. Pain.     COMPARISON:  No Comparisons Available     TECHNIQUE:   A minimum of three radiologic views of the left wrist were obtained.     FINDINGS:  Osteopenia. There is an intra-articular mildly impacted comminuted  fracture distal radial metaphysis. There is a nondisplaced fracture of  the distal ulnar styloid process. There is loss normal volar tilt of the  distal radius. The dorsal fracture fragment of the radius is displaced  by about 5 mm. There is soft tissue swelling. There is severe joint  space narrowing of the first carpal metacarpal joint and triscaphe the  joint.      IMPRESSION:  Intra-articular comminuted impacted mildly displaced distal radial  metaphyseal fracture. Mildly displaced ulnar styloid process fracture.  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     Electronically Signed By-Daksha Pierson MD On:8/16/2022 9:16 PM  This report was finalized on [id number] by  Daksha Pierson MD.    7/25/2022  Interpretation Summary  ECHO read by Dr. Ayala     Left ventricular ejection fraction appears to be 56 - 60%.  The right ventricular cavity is mildly dilated. No pericardial effusion noted            Patient had IV established and blood work was obtained EKG shows sinus bradycardia with rate of 44-the patient was hypertensive throughout his emergency room stay states he did not take his home medications today he was treated with Norvasc and HCTZ which he takes at home-he does have a swollen left arm which needs to probably be worked up for DVT I will order the Doppler for the morning.-The patient will be admitted for symptomatic bradycardia he will have cardiology consulted in the morning for the possibility of pacemaker placement.    Patient and her and his wife were  agreeable this plan of care.       Amount and/or Complexity of Data Reviewed  Clinical lab tests: reviewed  Tests in the radiology section of CPT®: reviewed  Tests in the medicine section of CPT®: reviewed    Risk of Complications, Morbidity, and/or Mortality  Presenting problems: high  Diagnostic procedures: high  Management options: high        Final diagnoses:   Bradycardia with 41-50 beats per minute   Dizzy spells   Edema of left upper arm       ED Disposition  ED Disposition     ED Disposition   Decision to Admit    Condition   --    Comment   Level of Care: Telemetry [5]   Admitting Physician: OLGA KRAUSE [[id number]]   Attending Physician: OLGA KRAUSE [[id number]]               No follow-up provider specified.       Medication List      ASK your doctor about these medications    hydroCHLOROthiazide 12.5 MG tablet  Commonly known as: HYDRODIURIL  Ask about: Which instructions should I use?             Deidre Bucio, APRN  10/13/22 1933

## 2022-10-14 ENCOUNTER — APPOINTMENT (OUTPATIENT)
Dept: CARDIOLOGY | Facility: HOSPITAL | Age: 82
End: 2022-10-14

## 2022-10-14 PROBLEM — M79.89 LEFT ARM SWELLING: Status: ACTIVE | Noted: 2022-10-14

## 2022-10-14 PROBLEM — Z99.89 OSA ON CPAP: Status: ACTIVE | Noted: 2022-10-14

## 2022-10-14 PROBLEM — K21.9 GERD WITHOUT ESOPHAGITIS: Status: ACTIVE | Noted: 2022-10-14

## 2022-10-14 PROBLEM — J44.9 COPD (CHRONIC OBSTRUCTIVE PULMONARY DISEASE): Status: ACTIVE | Noted: 2022-10-14

## 2022-10-14 PROBLEM — I10 HTN (HYPERTENSION): Status: ACTIVE | Noted: 2022-10-14

## 2022-10-14 PROBLEM — G47.33 OSA ON CPAP: Status: ACTIVE | Noted: 2022-10-14

## 2022-10-14 LAB
ANION GAP SERPL CALCULATED.3IONS-SCNC: 9 MMOL/L (ref 5–15)
BH CV UPPER VENOUS LEFT AXILLARY AUGMENT: NORMAL
BH CV UPPER VENOUS LEFT AXILLARY COMPRESS: NORMAL
BH CV UPPER VENOUS LEFT AXILLARY PHASIC: NORMAL
BH CV UPPER VENOUS LEFT AXILLARY SPONT: NORMAL
BH CV UPPER VENOUS LEFT BASILIC FOREARM COMPRESS: NORMAL
BH CV UPPER VENOUS LEFT BASILIC UPPER COMPRESS: NORMAL
BH CV UPPER VENOUS LEFT BRACHIAL COMPRESS: NORMAL
BH CV UPPER VENOUS LEFT CEPHALIC FOREARM COMPRESS: NORMAL
BH CV UPPER VENOUS LEFT CEPHALIC UPPER COMPRESS: NORMAL
BH CV UPPER VENOUS LEFT INTERNAL JUGULAR AUGMENT: NORMAL
BH CV UPPER VENOUS LEFT INTERNAL JUGULAR COMPRESS: NORMAL
BH CV UPPER VENOUS LEFT INTERNAL JUGULAR PHASIC: NORMAL
BH CV UPPER VENOUS LEFT INTERNAL JUGULAR SPONT: NORMAL
BH CV UPPER VENOUS LEFT RADIAL COMPRESS: NORMAL
BH CV UPPER VENOUS LEFT SUBCLAVIAN AUGMENT: NORMAL
BH CV UPPER VENOUS LEFT SUBCLAVIAN COMPRESS: NORMAL
BH CV UPPER VENOUS LEFT SUBCLAVIAN PHASIC: NORMAL
BH CV UPPER VENOUS LEFT SUBCLAVIAN SPONT: NORMAL
BH CV UPPER VENOUS LEFT ULNAR COMPRESS: NORMAL
BH CV UPPER VENOUS RIGHT INTERNAL JUGULAR AUGMENT: NORMAL
BH CV UPPER VENOUS RIGHT INTERNAL JUGULAR COMPRESS: NORMAL
BH CV UPPER VENOUS RIGHT INTERNAL JUGULAR PHASIC: NORMAL
BH CV UPPER VENOUS RIGHT INTERNAL JUGULAR SPONT: NORMAL
BH CV UPPER VENOUS RIGHT SUBCLAVIAN AUGMENT: NORMAL
BH CV UPPER VENOUS RIGHT SUBCLAVIAN COMPRESS: NORMAL
BH CV UPPER VENOUS RIGHT SUBCLAVIAN PHASIC: NORMAL
BH CV UPPER VENOUS RIGHT SUBCLAVIAN SPONT: NORMAL
BUN SERPL-MCNC: 12 MG/DL (ref 8–23)
BUN/CREAT SERPL: 9.2 (ref 7–25)
CA-I SERPL ISE-MCNC: 1.21 MMOL/L (ref 1.2–1.3)
CALCIUM SPEC-SCNC: 8.8 MG/DL (ref 8.6–10.5)
CHLORIDE SERPL-SCNC: 105 MMOL/L (ref 98–107)
CO2 SERPL-SCNC: 26 MMOL/L (ref 22–29)
CREAT SERPL-MCNC: 1.31 MG/DL (ref 0.76–1.27)
DEPRECATED RDW RBC AUTO: 48.6 FL (ref 37–54)
EGFRCR SERPLBLD CKD-EPI 2021: 55.4 ML/MIN/1.73
ERYTHROCYTE [DISTWIDTH] IN BLOOD BY AUTOMATED COUNT: 14.9 % (ref 12.3–15.4)
GLUCOSE SERPL-MCNC: 109 MG/DL (ref 65–99)
HCT VFR BLD AUTO: 38 % (ref 37.5–51)
HGB BLD-MCNC: 12.3 G/DL (ref 13–17.7)
MAGNESIUM SERPL-MCNC: 1.9 MG/DL (ref 1.6–2.4)
MAXIMAL PREDICTED HEART RATE: 141 BPM
MCH RBC QN AUTO: 30.1 PG (ref 26.6–33)
MCHC RBC AUTO-ENTMCNC: 32.4 G/DL (ref 31.5–35.7)
MCV RBC AUTO: 92.9 FL (ref 79–97)
PLATELET # BLD AUTO: 181 10*3/MM3 (ref 140–450)
PMV BLD AUTO: 9.3 FL (ref 6–12)
POTASSIUM SERPL-SCNC: 3.5 MMOL/L (ref 3.5–5.2)
QT INTERVAL: 525 MS
RBC # BLD AUTO: 4.09 10*6/MM3 (ref 4.14–5.8)
SODIUM SERPL-SCNC: 140 MMOL/L (ref 136–145)
STRESS TARGET HR: 120 BPM
T4 FREE SERPL-MCNC: 1.44 NG/DL (ref 0.93–1.7)
TROPONIN T SERPL-MCNC: <0.01 NG/ML (ref 0–0.03)
TSH SERPL DL<=0.05 MIU/L-ACNC: 3.03 UIU/ML (ref 0.27–4.2)
WBC NRBC COR # BLD: 5.7 10*3/MM3 (ref 3.4–10.8)

## 2022-10-14 PROCEDURE — 94799 UNLISTED PULMONARY SVC/PX: CPT

## 2022-10-14 PROCEDURE — 84439 ASSAY OF FREE THYROXINE: CPT | Performed by: NURSE PRACTITIONER

## 2022-10-14 PROCEDURE — 85027 COMPLETE CBC AUTOMATED: CPT | Performed by: NURSE PRACTITIONER

## 2022-10-14 PROCEDURE — 94760 N-INVAS EAR/PLS OXIMETRY 1: CPT

## 2022-10-14 PROCEDURE — 97162 PT EVAL MOD COMPLEX 30 MIN: CPT

## 2022-10-14 PROCEDURE — 36415 COLL VENOUS BLD VENIPUNCTURE: CPT | Performed by: NURSE PRACTITIONER

## 2022-10-14 PROCEDURE — 93971 EXTREMITY STUDY: CPT

## 2022-10-14 PROCEDURE — 84484 ASSAY OF TROPONIN QUANT: CPT | Performed by: NURSE PRACTITIONER

## 2022-10-14 PROCEDURE — 99214 OFFICE O/P EST MOD 30 MIN: CPT | Performed by: INTERNAL MEDICINE

## 2022-10-14 PROCEDURE — 97165 OT EVAL LOW COMPLEX 30 MIN: CPT

## 2022-10-14 PROCEDURE — 84443 ASSAY THYROID STIM HORMONE: CPT | Performed by: NURSE PRACTITIONER

## 2022-10-14 PROCEDURE — 83735 ASSAY OF MAGNESIUM: CPT | Performed by: NURSE PRACTITIONER

## 2022-10-14 PROCEDURE — G0378 HOSPITAL OBSERVATION PER HR: HCPCS

## 2022-10-14 PROCEDURE — 82330 ASSAY OF CALCIUM: CPT | Performed by: NURSE PRACTITIONER

## 2022-10-14 PROCEDURE — 80048 BASIC METABOLIC PNL TOTAL CA: CPT | Performed by: NURSE PRACTITIONER

## 2022-10-14 RX ORDER — POTASSIUM CHLORIDE 20 MEQ/1
20 TABLET, EXTENDED RELEASE ORAL DAILY
Status: DISCONTINUED | OUTPATIENT
Start: 2022-10-14 | End: 2022-10-15 | Stop reason: HOSPADM

## 2022-10-14 RX ORDER — AMLODIPINE BESYLATE 5 MG/1
5 TABLET ORAL
Status: DISCONTINUED | OUTPATIENT
Start: 2022-10-14 | End: 2022-10-15 | Stop reason: HOSPADM

## 2022-10-14 RX ORDER — PANTOPRAZOLE SODIUM 40 MG/1
40 TABLET, DELAYED RELEASE ORAL EVERY MORNING
Status: DISCONTINUED | OUTPATIENT
Start: 2022-10-14 | End: 2022-10-15 | Stop reason: HOSPADM

## 2022-10-14 RX ORDER — MELATONIN
1000 DAILY
Status: DISCONTINUED | OUTPATIENT
Start: 2022-10-14 | End: 2022-10-15 | Stop reason: HOSPADM

## 2022-10-14 RX ORDER — SODIUM CHLORIDE 9 MG/ML
100 INJECTION, SOLUTION INTRAVENOUS CONTINUOUS
Status: DISCONTINUED | OUTPATIENT
Start: 2022-10-14 | End: 2022-10-15 | Stop reason: HOSPADM

## 2022-10-14 RX ORDER — BUDESONIDE AND FORMOTEROL FUMARATE DIHYDRATE 80; 4.5 UG/1; UG/1
2 AEROSOL RESPIRATORY (INHALATION)
Status: DISCONTINUED | OUTPATIENT
Start: 2022-10-14 | End: 2022-10-15 | Stop reason: HOSPADM

## 2022-10-14 RX ORDER — ATORVASTATIN CALCIUM 20 MG/1
20 TABLET, FILM COATED ORAL DAILY
Status: DISCONTINUED | OUTPATIENT
Start: 2022-10-14 | End: 2022-10-15 | Stop reason: HOSPADM

## 2022-10-14 RX ORDER — HYDROCHLOROTHIAZIDE 12.5 MG/1
12.5 TABLET ORAL DAILY
Status: DISCONTINUED | OUTPATIENT
Start: 2022-10-14 | End: 2022-10-14

## 2022-10-14 RX ORDER — IPRATROPIUM BROMIDE AND ALBUTEROL SULFATE 2.5; .5 MG/3ML; MG/3ML
3 SOLUTION RESPIRATORY (INHALATION) EVERY 4 HOURS PRN
Status: DISCONTINUED | OUTPATIENT
Start: 2022-10-14 | End: 2022-10-15 | Stop reason: HOSPADM

## 2022-10-14 RX ADMIN — Medication 3 ML: at 10:26

## 2022-10-14 RX ADMIN — IPRATROPIUM BROMIDE 0.5 MG: 0.5 SOLUTION RESPIRATORY (INHALATION) at 20:07

## 2022-10-14 RX ADMIN — PANTOPRAZOLE SODIUM 40 MG: 40 TABLET, DELAYED RELEASE ORAL at 06:31

## 2022-10-14 RX ADMIN — ATORVASTATIN CALCIUM 20 MG: 20 TABLET, FILM COATED ORAL at 10:23

## 2022-10-14 RX ADMIN — IPRATROPIUM BROMIDE 0.5 MG: 0.5 SOLUTION RESPIRATORY (INHALATION) at 11:07

## 2022-10-14 RX ADMIN — BUDESONIDE AND FORMOTEROL FUMARATE DIHYDRATE 2 PUFF: 80; 4.5 AEROSOL RESPIRATORY (INHALATION) at 20:07

## 2022-10-14 RX ADMIN — SODIUM CHLORIDE 75 ML/HR: 9 INJECTION, SOLUTION INTRAVENOUS at 10:24

## 2022-10-14 RX ADMIN — AMLODIPINE BESYLATE 5 MG: 5 TABLET ORAL at 10:23

## 2022-10-14 RX ADMIN — Medication 1000 UNITS: at 10:23

## 2022-10-14 RX ADMIN — POTASSIUM CHLORIDE 20 MEQ: 1500 TABLET, EXTENDED RELEASE ORAL at 10:23

## 2022-10-14 NOTE — H&P
[facility] Medicine Services      Patient Name: Christopher Talbert  : 1940  MRN: [id number]  Primary Care Physician:  Provider, No Known  Date of admission: 10/13/2022      Subjective      Chief Complaint: Low heart rate    History of Present Illness: Christopher Talbert is a very pleasant 81 y.o. male who presented to [facility] on 10/13/2022 complaining of a low heart rate.  He he fell and broke his left radius and ulna in August and was going to physical therapy.  Yesterday the physical therapist said he could not exercise because his heart rate was in the 30s.  He reports some occasional lightheadedness but denies any chest pain, dyspnea or syncope.  He reports his fall in August was not due to syncope or lightheadedness.  Today in ED his heart rate has been in the 30s and 40s.  He reports he was placed on a beta-blocker propanolol but has been taking it for several months.  He has a past medical history of hypertension but denies any cardiac disease.  2D echo in August here shows heart failure preserved EF 55 to 60%.  He reports he recently moved here from [place].  He reports he thinks he had a cardiac stress test in the past but it was normal.  He has a past medical history of COPD is a non-smoker and does not use home oxygen.  He does have hypertension and BP was elevated on admission here he was given amlodipine and hydrochlorothiazide in ED.  He will be admitted for continuous cardiac monitoring and cardiology has been consulted.  Troponin was less than 0.010 proBNP was not elevated and EKG showed bradycardia heart rate 44 with right bundle branch block.  He also complained of swelling in his left upper extremity from above his wrist to past his elbow.  He reports he saw orthopedics and they said that was normal but he reports it is gotten worse.  D-dimer was elevated.  He denies any shortness of air or chest pain.  Left upper extremity Doppler has been ordered and  since this cannot be done until a.m. he was given a one-time dose of treatment Lovenox.  He will be admitted for further evaluation and treatment      Review of Systems   Constitutional: Negative.   HENT: Negative.    Eyes: Negative.    Cardiovascular: Negative.    Respiratory: Negative.    Endocrine: Negative.    Hematologic/Lymphatic: Negative.    Skin: Negative.    Musculoskeletal: Negative.    Gastrointestinal: Negative.    Genitourinary: Negative.    Neurological: Positive for light-headedness.   Psychiatric/Behavioral: Negative.    Allergic/Immunologic: Negative.    All other systems reviewed and are negative.      Personal History     Past Medical History:   Diagnosis Date   • COPD (chronic obstructive pulmonary disease) (HCC)    • Hypertension    • 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HCC)        Past Surgical History:   Procedure Laterality Date   • APPENDECTOMY     • LUNG REMOVAL, PARTIAL     • SPINE SURGERY         Family History: family history is not on file. Otherwise pertinent FHx was reviewed and not pertinent to current issue.    Social History:  reports that he has quit smoking. He has never used smokeless tobacco.    Home Medications:  Prior to Admission Medications     Prescriptions Last Dose Informant Patient Reported? Taking?    albuterol sulfate  (90 Base) MCG/ACT inhaler   Yes Yes    Inhale 1 puff Every 4 (Four) Hours As Needed for Wheezing.    amLODIPine (NORVASC) 5 MG tablet   No Yes    Take 1 tablet by mouth Daily.    atorvastatin (LIPITOR) 20 MG tablet   Yes Yes    Take 20 mg by mouth Daily.    benzonatate (TESSALON) 100 MG capsule   Yes Yes    Take 100 mg by mouth 3 (Three) Times a Day As Needed.    cholecalciferol (VITAMIN D3) 25 MCG (1000 UT) tablet   Yes Yes    Take 1,000 Units by mouth Daily.    Fluticasone-Umeclidin-Vilant (Trelegy Ellipta) 100-62.5-25 MCG/INH inhaler   Yes Yes    Inhale 1 puff Daily.    hydroCHLOROthiazide (HYDRODIURIL) 12.5 MG tablet   Yes Yes    Take 1 tablet by mouth Daily.     ipratropium-albuterol (DUO-NEB) 0.5-2.5 mg/3 ml nebulizer   Yes Yes    Take 3 mL by nebulization Every 4 (Four) Hours As Needed for Wheezing or Shortness of Air.    irbesartan (AVAPRO) 300 MG tablet   Yes Yes    Take 300 mg by mouth Daily.    omeprazole (priLOSEC) 20 MG capsule   Yes Yes    Take 20 mg by mouth Daily.    Potassium 99 MG tablet   Yes Yes    Take 99 mg by mouth Daily.    propranolol (INDERAL) 10 MG tablet   Yes Yes    Take 1 tablet by mouth 2 (Two) Times a Day.            Allergies:  Allergies   Allergen Reactions   • Penicillins Unknown - High Severity       Objective      Vitals:   Temp:  [98 °F (36.7 °C)] 98 °F (36.7 °C)  Heart Rate:  [41-60] 45  Resp:  [14-16] 16  BP: (124-203)/() 191/83    Physical Exam  Vitals reviewed.   Constitutional:       Appearance: Normal appearance. He is normal weight.   HENT:      Head: Normocephalic and atraumatic.      Right Ear: External ear normal.      Left Ear: External ear normal.      Nose: Nose normal.      Mouth/Throat:      Mouth: Mucous membranes are moist.   Eyes:      Extraocular Movements: Extraocular movements intact.   Cardiovascular:      Rate and Rhythm: Regular rhythm. Bradycardia present.      Pulses: Normal pulses.      Heart sounds: Normal heart sounds.   Pulmonary:      Effort: Pulmonary effort is normal.      Breath sounds: Normal breath sounds.   Abdominal:      Palpations: Abdomen is soft.   Genitourinary:     Comments: deferred  Musculoskeletal:         General: Swelling present. Normal range of motion.      Cervical back: Normal range of motion and neck supple.      Comments: Swelling LUE from above elbow to above wrist    Skin:     General: Skin is warm and dry.   Neurological:      General: No focal deficit present.      Mental Status: He is alert and oriented to person, place, and time.   Psychiatric:         Mood and Affect: Mood normal.         Behavior: Behavior normal.         Thought Content: Thought content normal.          Judgment: Judgment normal.         Result Review    Result Review:  I have personally reviewed the results from the time of this admission to 10/14/2022 02:08 EDT and agree with these findings:  [x]  Laboratory  []  Microbiology  [x]  Radiology  [x]  EKG/Telemetry   []  Cardiology/Vascular   []  Pathology  []  Old records  []  Other:  Most notable findings include: Troponin less than 0.010 proBNP 436.6 calcium 8.4 D-dimer 2.74, chest x-ray shows cardiomegaly per radiology, EKG shows sinus bradycardia heart rate 44, right bundle branch block, echo 7/25/2022 ejection fraction 56 to 60% right ventricle cavity mildly dilated    Assessment & Plan        Active Hospital Problems:  Active Hospital Problems    Diagnosis    • **Bradycardia with 41-50 beats per minute    • HTN (hypertension)    • Left arm swelling    • JEFF on CPAP    • GERD without esophagitis    • COPD (chronic obstructive pulmonary disease) ([facility])      Plan:    Bradycardia, heart rate between 30s and 40s denies shortness of air intermittent lightheadedness denies chest pain, continuous cardiac monitoring, cardiology has been consulted hold home propanolol and losartan    Hypertension, elevated on admission received amlodipine and hydrochlorothiazide in ED, reordered home amlodipine and hydrochlorothiazide hold home propanolol and losartan for now monitor BP cardiology consulted    Left arm swellings status post left wrist fracture in August but reports swelling is more recent D-dimer was elevated left upper extremity Doppler ordered for a.m. is unavailable now, one-time dose treatment Lovenox    Obstructive sleep apnea, preferred 2 L nasal cannula oxygen while inpatient    GERD without esophagitis on PPI    COPD, home DuoNebs every 4 hours as needed, pharmacy to dose Trelegy    Hyperlipidemia on home statin    Dietary supplementation on home potassium reordered potassium 3.8      DVT prophylaxis:  Medical DVT prophylaxis orders are present.    CODE STATUS:     Code Status (Patient has no pulse and is not breathing): CPR (Attempt to Resuscitate)  Medical Interventions (Patient has pulse or is breathing): Full Support    Admission Status:  I believe this patient meets observation status.    I discussed the patient's findings and my recommendations with patient.    This patient has been examined wearing appropriate Personal Protective Equipment. 10/14/22      Signature: Electronically signed by ARELIS Suresh, 10/14/22, 2:21 AM EDT.

## 2022-10-14 NOTE — PROGRESS NOTES
[facility] Medicine Services Daily Progress Note    Patient Name: Christopher Talbert  : 1940  MRN: [id number]  Primary Care Physician:  Provider, No Known  Date of admission: 10/13/2022      Subjective      Chief Complaint: Bradycardia    Seen examined bedside.  No specific complaints.  Heart rate in the 60s.    ROS   Cardiac no chest pain or palpitations  GI no nausea no vomiting  Pulmonary no shortness of breath no cough      Objective      Vitals:   Temp:  [97.4 °F (36.3 °C)-98 °F (36.7 °C)] 97.5 °F (36.4 °C)  Heart Rate:  [41-70] 53  Resp:  [14-18] 16  BP: (124-203)/() 147/70  Flow (L/min):  [2] 2    Physical Exam   Physical Exam   Constitutional:  oriented to person, place, and time. No distress.   HENT:   Head: Normocephalic and atraumatic.   Eyes: Conjunctivae and EOM are normal. Pupils are equal, round, and reactive to light.   Neck: No JVD present. No thyromegaly present.   Cardiovascular: Normal rate, regular rhythm, normal heart sounds and intact distal pulses. Exam reveals no gallop and no friction rub.   No murmur heard.  Pulmonary/Chest: Effort normal and breath sounds normal. No stridor. No respiratory distress.  has no wheezes.  has no rales.  exhibits no tenderness.   Abdominal: Soft. Bowel sounds are normal.  no distension and no mass. There is no tenderness. There is no rebound and no guarding. No hernia.   Musculoskeletal: Normal range of motion.   Lymphadenopathy:     no cervical adenopathy.   Neurological:  alert and oriented to person, place, and time. No cranial nerve deficit or sensory deficit. exhibits normal muscle tone.   Skin: No rash noted.  not diaphoretic.   Psychiatric:  normal mood and affect.   Vitals reviewed.         Result Review    Result Review:  I have personally reviewed the results from the time of this admission to 10/14/2022 12:31 EDT and agree with these findings:  [x]  Laboratory  []  Microbiology  []  Radiology  [x]  EKG/Telemetry   []   Cardiology/Vascular   []  Pathology  []  Old records  []  Other:          Assessment & Plan      Brief Patient Summary:  Christopher Talbert is a 81 y.o. male who presents with symptomatic bradycardia       amLODIPine, 5 mg, Oral, Q24H  atorvastatin, 20 mg, Oral, Daily  budesonide-formoterol, 2 puff, Inhalation, BID - RT   And  ipratropium, 0.5 mg, Nebulization, Q6H - RT  cholecalciferol, 1,000 Units, Oral, Daily  pantoprazole, 40 mg, Oral, QAM  potassium chloride, 20 mEq, Oral, Daily  sodium chloride, 3 mL, Intravenous, Q12H       Pharmacy to Dose enoxaparin (LOVENOX),   sodium chloride, 75 mL/hr, Last Rate: 75 mL/hr (10/14/22 1024)         Active Hospital Problems:  Active Hospital Problems    Diagnosis    • **Bradycardia with 41-50 beats per minute    • JEFF on CPAP    • HTN (hypertension)    • Left arm swelling    • GERD without esophagitis    • COPD (chronic obstructive pulmonary disease) (HCC)      Plan:   Bradycardia  Symptomatic  Rule out ACS  Hold propanolol losartan  Cardiology consulted    Hypertension  Continue amlodipine hydrochlorothiazide, propanolol losartan held    rangel  Ns started  hctz     Left arm swelling  Duplex pending to rule out DVT  This is status post fracture which was treated with a cast    COPD  Nebs as needed  Not in exacerbation    GERD  Continue PPI    Dyslipidemia  Continue statin    DVT PUD prophylaxis    Plan as above      DVT prophylaxis:  Medical DVT prophylaxis orders are present.    CODE STATUS:    Code Status (Patient has no pulse and is not breathing): CPR (Attempt to Resuscitate)  Medical Interventions (Patient has pulse or is breathing): Full Support      Disposition:  I expect patient to be discharged 40 to 72 hours.        Electronically signed by Bebeto Wilde MD, 10/14/22, 12:31 EDT.  [facility], [place], operated by [facility] Hospitalist Team

## 2022-10-14 NOTE — THERAPY EVALUATION
Patient Name: Christopher Talbert  : 1940    MRN: [id number]                              Today's Date: 10/14/2022       Admit Date: 10/13/2022    Visit Dx:     ICD-10-CM ICD-9-CM   1. Bradycardia with 41-50 beats per minute  R00.1 427.89   2. Dizzy spells  R42 780.4   3. Edema of left upper arm  R60.0 782.3     Patient Active Problem List   Diagnosis   • Leukocytosis, unspecified type   • Pneumonia of right middle lobe due to infectious organism   • Bradycardia with 41-50 beats per minute   • JEFF on CPAP   • HTN (hypertension)   • Left arm swelling   • GERD without esophagitis   • COPD (chronic obstructive pulmonary disease) (HCC)     Past Medical History:   Diagnosis Date   • COPD (chronic obstructive pulmonary disease) (HCC)    • Hypertension    • 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HCC)      Past Surgical History:   Procedure Laterality Date   • APPENDECTOMY     • LUNG REMOVAL, PARTIAL     • SPINE SURGERY        General Information     Row Name 10/14/22 1522          Physical Therapy Time and Intention    Document Type evaluation  -EL     Mode of Treatment individual therapy;physical therapy  -EL     Row Name 10/14/22 1522          General Information    Patient Profile Reviewed yes  -EL     Prior Level of Function independent:;all household mobility;ADL's  Fall in August with Radius frx, currently needs help with shoes  -EL     Row Name 10/14/22 1522          Living Environment    People in Home spouse  -EL     Row Name 10/14/22 1522          Cognition    Orientation Status (Cognition) oriented x 4  -EL     Row Name 10/14/22 1522          Safety Issues, Functional Mobility    Impairments Affecting Function (Mobility) grasp;pain  -EL           User Key  (r) = Recorded By, (t) = Taken By, (c) = Cosigned By    Initials Name Provider Type    Jose Beltran PT Physical Therapist               Mobility     Row Name 10/14/22 1526          Bed Mobility    Bed Mobility bed mobility (all) activities  -EL     All Activities, [place]  (Bed Mobility) independent  -EL     Row Name 10/14/22 1526          Bed-Chair Transfer    Bed-Chair [place] (Transfers) independent  -EL     Row Name 10/14/22 1526          Sit-Stand Transfer    Sit-Stand [place] (Transfers) independent  -EL     Row Name 10/14/22 1526          Gait/Stairs (Locomotion)    [place] Level (Gait) standby assist  -EL     Distance in Feet (Gait) 110  -EL     Comment, (Gait/Stairs) Mild lateral sway, no LOB  -EL           User Key  (r) = Recorded By, (t) = Taken By, (c) = Cosigned By    Initials Name Provider Type    Jose Beltran, PT Physical Therapist               Obj/Interventions     Row Name 10/14/22 1527          Range of Motion Comprehensive    General Range of Motion bilateral lower extremity ROM WFL  -[facility] Name 10/14/22 1527          Strength Comprehensive (MMT)    General Manual Muscle Testing (MMT) Assessment no strength deficits identified  -EL     Comment, General Manual Muscle Testing (MMT) Assessment BLE WFL  -     Row Name 10/14/22 1527          Sensory Assessment (Somatosensory)    Sensory Assessment (Somatosensory) sensation intact  -EL           User Key  (r) = Recorded By, (t) = Taken By, (c) = Cosigned By    Initials Name Provider Type    Jose Beltran, PT Physical Therapist               Goals/Plan    No documentation.                Clinical Impression     Row Name 10/14/22 1527          Pain    Pretreatment Pain Rating 4/10  -EL     Posttreatment Pain Rating 4/10  -EL     Pain Location - Side/Orientation Left  -EL     Pain Location upper  -EL     Pain Location - extremity  -EL     Row Name 10/14/22 1527          Plan of Care Review    Plan of Care Reviewed With patient  -EL     Outcome Evaluation Pt is a 80 YO M admitted with bradycardia, HR in the 30s upon admission. Pt with recent hx (Aug 2022) or R radius frx. Pt states he lives at home with spouse, generally is very independent and active. This date HR remainded 58-70 throughout  evaluation. Pt required no physical assistance for mobility and is ambulating safely with SBA. Pt appears safe to d/c home and continue OPPT following d/c. PT to sign off at this time and will need new orders if status changes.  -     Row Name 10/14/22 1527          Therapy Assessment/Plan (PT)    Criteria for Skilled Interventions Met (PT) no problems identified which require skilled intervention  -EL     Therapy Frequency (PT) evaluation only  -     Row Name 10/14/22 1527          Vital Signs    O2 Delivery Pre Treatment room air  -EL     O2 Delivery Intra Treatment room air  -EL     O2 Delivery Post Treatment room air  -EL     Pre Patient Position Supine  -EL     Intra Patient Position Standing  -EL     Post Patient Position Sitting  -EL     Row Name 10/14/22 1527          Positioning and Restraints    Pre-Treatment Position in bed  -EL     Post Treatment Position chair  -EL     In Chair notified nsg;sitting;exit alarm on;encouraged to call for assist  -EL           User Key  (r) = Recorded By, (t) = Taken By, (c) = Cosigned By    Initials Name Provider Type    EL Jose Archuleta, PT Physical Therapist               Outcome Measures     Row Name 10/14/22 1533          How much help from another person do you currently need...    Turning from your back to your side while in flat bed without using bedrails? 4  -EL     Moving from lying on back to sitting on the side of a flat bed without bedrails? 4  -EL     Moving to and from a bed to a chair (including a wheelchair)? 4  -EL     Standing up from a chair using your arms (e.g., wheelchair, bedside chair)? 4  -EL     Climbing 3-5 steps with a railing? 4  -EL     To walk in hospital room? 4  -EL     AM-PAC 6 Clicks Score (PT) 24  -EL     Highest level of mobility 8 --> Walked 250 feet or more  -     Row Name 10/14/22 1533          Functional Assessment    Outcome Measure Options AM-PAC 6 Clicks Basic Mobility (PT)  -EL           User Key  (r) = Recorded By, (t) =  Taken By, (c) = Cosigned By    Initials Name Provider Type    Jose Beltran PT Physical Therapist                             Physical Therapy Education     Title: PT OT SLP Therapies (Done)     Topic: Physical Therapy (Done)     Point: Mobility training (Done)     Learning Progress Summary           Patient Acceptance, E,TB, VU by NATE at 10/14/2022 1533                   Point: Precautions (Done)     Learning Progress Summary           Patient Acceptance, E,TB, VU by NATE at 10/14/2022 1533                               User Key     Initials Effective Dates Name Provider Type Discipline     06/23/20 -  Jose Archuleta PT Physical Therapist PT              PT Recommendation and Plan     Plan of Care Reviewed With: patient  Outcome Evaluation: Pt is a 80 YO M admitted with bradycardia, HR in the 30s upon admission. Pt with recent hx (Aug 2022) or R radius frx. Pt states he lives at home with spouse, generally is very independent and active. This date HR remainded 58-70 throughout evaluation. Pt required no physical assistance for mobility and is ambulating safely with SBA. Pt appears safe to d/c home and continue OPPT following d/c. PT to sign off at this time and will need new orders if status changes.     Time Calculation:    PT Charges     Row Name 10/14/22 1534             Time Calculation    Start Time 1405  -EL      Stop Time 1423  -EL      Time Calculation (min) 18 min  -EL      PT Received On 10/14/22  -            User Key  (r) = Recorded By, (t) = Taken By, (c) = Cosigned By    Initials Name Provider Type    Jose Beltran PT Physical Therapist              Therapy Charges for Today     Code Description Service Date Service Provider Modifiers Qty    37571613332 HC PT EVAL MOD COMPLEXITY 3 10/14/2022 Jose Archuleta PT GP 1          PT G-Codes  Outcome Measure Options: AM-PAC 6 Clicks Basic Mobility (PT)  AM-PAC 6 Clicks Score (PT): 24    Jose Archuleta PT  10/14/2022

## 2022-10-14 NOTE — DISCHARGE INSTR - APPOINTMENTS
Primary Care Appointment   Thursday, October 20, 2022 at 11:00 am and then 11:45 am  [facility]  [address], [place] [address]  ([phone])    Please arrive 15-20 minutes prior to appointment time and have paperwork completed prior. Bring hospital paperwork including medication list, ID, and insurance card with you.

## 2022-10-14 NOTE — PLAN OF CARE
Goal Outcome Evaluation: HR improved. PT OT went well. Venous doppler showed no clot

## 2022-10-14 NOTE — THERAPY EVALUATION
Patient Name: Christopher Talbert  : 1940    MRN: [id number]                              Today's Date: 10/14/2022       Admit Date: 10/13/2022    Visit Dx:     ICD-10-CM ICD-9-CM   1. Bradycardia with 41-50 beats per minute  R00.1 427.89   2. Dizzy spells  R42 780.4   3. Edema of left upper arm  R60.0 782.3     Patient Active Problem List   Diagnosis   • Leukocytosis, unspecified type   • Pneumonia of right middle lobe due to infectious organism   • Bradycardia with 41-50 beats per minute   • JEFF on CPAP   • HTN (hypertension)   • Left arm swelling   • GERD without esophagitis   • COPD (chronic obstructive pulmonary disease) (HCC)     Past Medical History:   Diagnosis Date   • COPD (chronic obstructive pulmonary disease) (HCC)    • Hypertension    • 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HCC)      Past Surgical History:   Procedure Laterality Date   • APPENDECTOMY     • LUNG REMOVAL, PARTIAL     • SPINE SURGERY        General Information     Row Name 10/14/22 1643          OT Time and Intention    Document Type evaluation  -MP     Mode of Treatment occupational therapy  -MP     Row Name 10/14/22 1643          General Information    Patient Profile Reviewed yes  -MP     Prior Level of Function independent:;ADL's  -MP     Row Name 10/14/22 1643          Living Environment    People in Home spouse  -MP     Row Name 10/14/22 1643          Cognition    Orientation Status (Cognition) oriented x 4  -MP     Row Name 10/14/22 1643          Safety Issues, Functional Mobility    Impairments Affecting Function (Mobility) grasp;pain  -MP           User Key  (r) = Recorded By, (t) = Taken By, (c) = Cosigned By    Initials Name Provider Type    MP Devonte Davey OT Occupational Therapist                 Mobility/ADL's     Row Name 10/14/22 164          Bed Mobility    Bed Mobility bed mobility (all) activities  -MP     All Activities, [place] (Bed Mobility) independent  -MP     Row Name 10/14/22 164          Transfers    Transfers  sit-stand transfer  -MP     Row Name 10/14/22 1644          Bed-Chair Transfer    Bed-Chair [place] (Transfers) independent  -MP     Row Name 10/14/22 1644          Sit-Stand Transfer    Sit-Stand [place] (Transfers) independent  -MP     Row Name 10/14/22 1644          Functional Mobility    Functional Mobility- Ind. Level independent  -MP     [facility] Name 10/14/22 1644          Activities of Daily Living    BADL Assessment/Intervention lower body dressing  -MP     Row Name 10/14/22 1644          Lower Body Dressing Assessment/Training    [place] Level (Lower Body Dressing) lower body dressing skills;moderate assist (50% patient effort)  -MP           User Key  (r) = Recorded By, (t) = Taken By, (c) = Cosigned By    Initials Name Provider Type    Devonte Hensley OT Occupational Therapist               Obj/Interventions     [facility] Name 10/14/22 1645          Range of Motion Comprehensive    Comment, General Range of Motion BUE WFL, limited ROM L wrist ext/flexion and supination/pronation  -[facility] Name 10/14/22 1645          Strength Comprehensive (MMT)    Comment, General Manual Muscle Testing (MMT) Assessment BUE WFL  -[facility] Name 10/14/22 1645          Balance    Balance Interventions standing;sitting;sit to stand;supported;dynamic;static  -MP           User Key  (r) = Recorded By, (t) = Taken By, (c) = Cosigned By    Initials Name Provider Type    Devonte Hensley OT Occupational Therapist               Goals/Plan    No documentation.                Clinical Impression     Row Name 10/14/22 1646          Pain Assessment    Pretreatment Pain Rating 4/10  -MP     Posttreatment Pain Rating 4/10  -MP     Pain Location - Side/Orientation Left  -MP     Pain Location upper  -MP     Pain Location - extremity  -MP     Row Name 10/14/22 1646          Plan of Care Review    Plan of Care Reviewed With patient  -MP     Progress no change  -MP     Outcome Evaluation Pt is a 82 YO M admitted with  bradycardia, HR in the 30s upon admission. Pt with recent hx (Aug 2022) or R radius frx. Pt states he lives at home with spouse, generally is very independent and active. This date HR remainded 58-70 throughout evaluation. Pt. completes functional transfers w/o assist this date, increased mod A for all LB ADLs as pt. still c/o LUE pain following fx. Pt. states spouse assists at home w/ ADLs PRN , recent started OP therapy. Pt. educated on HEP AAROM to increased wrist flexion/ext and FMC. Recommend continued OP therapy.  -MP     Row Name 10/14/22 1646          Therapy Assessment/Plan (OT)    Criteria for Skilled Therapeutic Interventions Met (OT) no;does not meet criteria for skilled intervention;no problems identified which require skilled intervention  -MP     Therapy Frequency (OT) evaluation only  -MP     Row Name 10/14/22 1646          Therapy Plan Review/Discharge Plan (OT)    Anticipated Discharge Disposition (OT) home with assist  -MP     Row Name 10/14/22 1646          Vital Signs    Pre Patient Position Supine  -MP     Intra Patient Position Standing  -MP     Post Patient Position Sitting  -MP     Row Name 10/14/22 1646          Positioning and Restraints    Pre-Treatment Position in bed  -MP     Post Treatment Position chair  -MP     In Chair call light within reach;encouraged to call for assist;sitting;exit alarm on  -MP           User Key  (r) = Recorded By, (t) = Taken By, (c) = Cosigned By    Initials Name Provider Type    Devonte Hensley, LEEANN Occupational Therapist               Outcome Measures     Row Name 10/14/22 5483          How much help from another person do you currently need...    Turning from your back to your side while in flat bed without using bedrails? 4  -EL     Moving from lying on back to sitting on the side of a flat bed without bedrails? 4  -EL     Moving to and from a bed to a chair (including a wheelchair)? 4  -EL     Standing up from a chair using your arms (e.g.,  wheelchair, bedside chair)? 4  -EL     Climbing 3-5 steps with a railing? 4  -EL     To walk in hospital room? 4  -EL     AM-PAC 6 Clicks Score (PT) 24  -EL     Highest level of mobility 8 --> Walked 250 feet or more  -     Row Name 10/14/22 1533          Functional Assessment    Outcome Measure Options AM-PAC 6 Clicks Basic Mobility (PT)  -           User Key  (r) = Recorded By, (t) = Taken By, (c) = Cosigned By    Initials Name Provider Type    Jose Beltran, PT Physical Therapist                  OT Recommendation and Plan  Therapy Frequency (OT): evaluation only  Plan of Care Review  Plan of Care Reviewed With: patient  Progress: no change  Outcome Evaluation: Pt is a 80 YO M admitted with bradycardia, HR in the 30s upon admission. Pt with recent hx (Aug 2022) or R radius frx. Pt states he lives at home with spouse, generally is very independent and active. This date HR remainded 58-70 throughout evaluation. Pt. completes functional transfers w/o assist this date, increased mod A for all LB ADLs as pt. still c/o LUE pain following fx. Pt. states spouse assists at home w/ ADLs PRN , recent started OP therapy. Pt. educated on HEP AAROM to increased wrist flexion/ext and FMC. Recommend continued OP therapy.     Time Calculation:    Time Calculation- OT     Row Name 10/14/22 1651             Time Calculation- OT    OT Start Time 1405  -MP      OT Stop Time 1423  -MP      OT Time Calculation (min) 18 min  -MP      Total Timed Code Minutes- OT 0 minute(s)  -      OT Received On 10/14/22  -            User Key  (r) = Recorded By, (t) = Taken By, (c) = Cosigned By    Initials Name Provider Type    Devonte Hensley OT Occupational Therapist              Therapy Charges for Today     Code Description Service Date Service Provider Modifiers Qty    [id number]  OT EVAL LOW COMPLEXITY 3 10/14/2022 Devonte Davey OT GO 1               Devonte Davey OT  10/14/2022

## 2022-10-14 NOTE — CONSULTS
Referring Provider: Hospitalist  Reason for Consultation: Bradycardia    Patient Care Team:  Provider, No Known as PCP - General    Chief complaint low heart rates    Subjective .     History of present illness:  Christopher Talbert is a 81 y.o. male with history of hypertension COPD and other medical problems presented to the hospital from physical therapy with bradycardia.  Patient had an left arm injury and was going to physical therapy.  At physical therapy the physical therapist noted that his heart rate was low and hence she was sent to our ER.  In the ER patient's heart has been in the 30s and 40s but was on beta-blockers.  Patient had some occasional lightheadedness but no chest pain shortness of breath palpitations syncope or swelling of the feet.    Review of Systems   Constitutional: Negative for fever and malaise/fatigue.   HENT: Negative for ear pain and nosebleeds.    Eyes: Negative for blurred vision and double vision.   Cardiovascular: Negative for chest pain, dyspnea on exertion and palpitations.   Respiratory: Negative for cough and shortness of breath.    Skin: Negative for rash.   Musculoskeletal: Negative for joint pain.   Gastrointestinal: Negative for abdominal pain, nausea and vomiting.   Neurological: Positive for dizziness. Negative for focal weakness and headaches.   Psychiatric/Behavioral: Negative for depression. The patient is not nervous/anxious.    All other systems reviewed and are negative.      History  Past Medical History:   Diagnosis Date   • COPD (chronic obstructive pulmonary disease) (HCC)    • Hypertension    • 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HCC)        Past Surgical History:   Procedure Laterality Date   • APPENDECTOMY     • LUNG REMOVAL, PARTIAL     • SPINE SURGERY         History reviewed. No pertinent family history.    Social History     Tobacco Use   • Smoking status: Former   • Smokeless tobacco: Never   Vaping Use   • Vaping Use: Never used        Medications Prior to Admission    Medication Sig Dispense Refill Last Dose   • albuterol sulfate  (90 Base) MCG/ACT inhaler Inhale 1 puff Every 4 (Four) Hours As Needed for Wheezing.      • amLODIPine (NORVASC) 5 MG tablet Take 1 tablet by mouth Daily. 30 tablet 0    • atorvastatin (LIPITOR) 20 MG tablet Take 20 mg by mouth Daily.      • benzonatate (TESSALON) 100 MG capsule Take 100 mg by mouth 3 (Three) Times a Day As Needed.      • cholecalciferol (VITAMIN D3) 25 MCG (1000 UT) tablet Take 1,000 Units by mouth Daily.      • Fluticasone-Umeclidin-Vilant (Trelegy Ellipta) 100-62.5-25 MCG/INH inhaler Inhale 1 puff Daily.      • hydroCHLOROthiazide (HYDRODIURIL) 12.5 MG tablet Take 1 tablet by mouth Daily.      • ipratropium-albuterol (DUO-NEB) 0.5-2.5 mg/3 ml nebulizer Take 3 mL by nebulization Every 4 (Four) Hours As Needed for Wheezing or Shortness of Air.      • irbesartan (AVAPRO) 300 MG tablet Take 300 mg by mouth Daily.      • omeprazole (priLOSEC) 20 MG capsule Take 20 mg by mouth Daily.      • Potassium 99 MG tablet Take 99 mg by mouth Daily.      • propranolol (INDERAL) 10 MG tablet Take 1 tablet by mouth 2 (Two) Times a Day.            Penicillins    Scheduled Meds:amLODIPine, 5 mg, Oral, Q24H  atorvastatin, 20 mg, Oral, Daily  budesonide-formoterol, 2 puff, Inhalation, BID - RT   And  ipratropium, 0.5 mg, Nebulization, Q6H - RT  cholecalciferol, 1,000 Units, Oral, Daily  pantoprazole, 40 mg, Oral, QAM  potassium chloride, 20 mEq, Oral, Daily  sodium chloride, 3 mL, Intravenous, Q12H      Continuous Infusions:Pharmacy to Dose enoxaparin (LOVENOX),   sodium chloride, 100 mL/hr, Last Rate: 75 mL/hr (10/14/22 1024)      PRN Meds:.•  acetaminophen **OR** acetaminophen **OR** acetaminophen  •  ipratropium-albuterol  •  melatonin  •  nitroglycerin  •  ondansetron **OR** ondansetron  •  Pharmacy to Dose enoxaparin (LOVENOX)  •  sodium chloride  •  sodium chloride    Objective     VITAL SIGNS  Vitals:    10/14/22 0250 10/14/22 0608  "10/14/22 1107 10/14/22 1111   BP: 137/54 147/70     BP Location: Right arm Right arm     Patient Position: Lying Lying     Pulse: 70 56 52 53   Resp: 18 16 16 16   Temp: 97.4 °F (36.3 °C) 97.5 °F (36.4 °C)     TempSrc: Oral Oral     SpO2: 99% 98% 99% 98%   Weight:  87.7 kg (193 lb 5.5 oz)     Height:           Flowsheet Rows    Flowsheet Row First Filed Value   Admission Height 180.3 cm (71\") Documented at 10/13/2022 1524   Admission Weight 88.5 kg (195 lb) Documented at 10/13/2022 1524           TELEMETRY: Sinus bradycardia    Physical Exam:  Constitutional:       Appearance: Well-developed.   Eyes:      General: No scleral icterus.     Conjunctiva/sclera: Conjunctivae normal.      Pupils: Pupils are equal, round, and reactive to light.   HENT:      Head: Normocephalic and atraumatic.   Neck:      Vascular: No carotid bruit or JVD.   Pulmonary:      Effort: Pulmonary effort is normal.      Breath sounds: Normal breath sounds. No wheezing. No rales.   Cardiovascular:      Normal rate. Regular rhythm.   Pulses:     Intact distal pulses.   Abdominal:      General: Bowel sounds are normal.      Palpations: Abdomen is soft.   Musculoskeletal: Normal range of motion.      Cervical back: Normal range of motion and neck supple. Skin:     General: Skin is warm and dry.      Findings: No rash.   Neurological:      Mental Status: Alert.      Comments: No focal deficits          Results Review:   I reviewed the patient's new clinical results.  Lab Results (last 24 hours)     Procedure Component Value Units Date/Time    TSH [[id number]]  (Normal) Collected: 10/14/22 0408    Specimen: Blood Updated: 10/14/22 0528     TSH 3.030 uIU/mL     Troponin [[id number]]  (Normal) Collected: 10/14/22 0408    Specimen: Blood Updated: 10/14/22 0528     Troponin T <0.010 ng/mL     Narrative:      Troponin T Reference Range:  <= 0.03 ng/mL-   Negative for AMI  >0.03 ng/mL-     Abnormal for myocardial necrosis.  Clinicians would have to utilize " clinical acumen, EKG, Troponin and serial changes to determine if it is an Acute Myocardial Infarction or myocardial injury due to an underlying chronic condition.       Results may be falsely decreased if patient taking Biotin.      T4, Free [[id number]]  (Normal) Collected: 10/14/22 0408    Specimen: Blood Updated: 10/14/22 0528     Free T4 1.44 ng/dL     Narrative:      Results may be falsely increased if patient taking Biotin.      Basic Metabolic Panel [[id number]]  (Abnormal) Collected: 10/14/22 0408    Specimen: Blood Updated: 10/14/22 0526     Glucose 109 mg/dL      BUN 12 mg/dL      Creatinine 1.31 mg/dL      Sodium 140 mmol/L      Potassium 3.5 mmol/L      Chloride 105 mmol/L      CO2 26.0 mmol/L      Calcium 8.8 mg/dL      BUN/Creatinine Ratio 9.2     Anion Gap 9.0 mmol/L      eGFR 55.4 mL/min/1.73      Comment: National Kidney Foundation and American Society of Nephrology (ASN) Task Force recommended calculation based on the Chronic Kidney Disease Epidemiology Collaboration (CKD-EPI) equation refit without adjustment for race.       Narrative:      GFR Normal >60  Chronic Kidney Disease <60  Kidney Failure <15      Magnesium [[id number]]  (Normal) Collected: 10/14/22 0408    Specimen: Blood Updated: 10/14/22 0526     Magnesium 1.9 mg/dL     Calcium, Ionized [[id number]]  (Normal) Collected: 10/14/22 0408    Specimen: Blood Updated: 10/14/22 0517     Ionized Calcium 1.21 mmol/L     CBC (No Diff) [[id number]]  (Abnormal) Collected: 10/14/22 0408    Specimen: Blood Updated: 10/14/22 0459     WBC 5.70 10*3/mm3      RBC 4.09 10*6/mm3      Hemoglobin 12.3 g/dL      Hematocrit 38.0 %      MCV 92.9 fL      MCH 30.1 pg      MCHC 32.4 g/dL      RDW 14.9 %      RDW-SD 48.6 fl      MPV 9.3 fL      Platelets 181 10*3/mm3     D-dimer, Quantitative [[id number]]  (Abnormal) Collected: 10/13/22 1915    Specimen: Blood Updated: 10/13/22 1932     D-Dimer, Quantitative 2.75 mg/L (FEU)     Narrative:      Reference  Range  --------------------------------------------------------------------     < 0.50   Negative Predictive Value  0.50-0.59   Indeterminate    >= 0.60   Probable VTE             A very low percentage of patients with DVT may yield D-Dimer results   below the cut-off of 0.50 mg/L FEU.  This is known to be more   prevalent in patients with distal DVT.             Results of this test should always be interpreted in conjunction with   the patient's medical history, clinical presentation and other   findings.  Clinical diagnosis should not be based on the result of   [place] D-Dimer alone.    Comprehensive Metabolic Panel [[id number]]  (Abnormal) Collected: 10/13/22 1716    Specimen: Blood Updated: 10/13/22 1748     Glucose 94 mg/dL      BUN 11 mg/dL      Creatinine 1.11 mg/dL      Sodium 140 mmol/L      Potassium 3.8 mmol/L      Comment: Slight hemolysis detected by analyzer. Results may be affected.        Chloride 106 mmol/L      CO2 25.0 mmol/L      Calcium 8.4 mg/dL      Total Protein 6.2 g/dL      Albumin 3.80 g/dL      ALT (SGPT) 11 U/L      AST (SGOT) 19 U/L      Alkaline Phosphatase 108 U/L      Total Bilirubin 0.6 mg/dL      Globulin 2.4 gm/dL      A/G Ratio 1.6 g/dL      BUN/Creatinine Ratio 9.9     Anion Gap 9.0 mmol/L      eGFR 67.5 mL/min/1.73      Comment: National Kidney Foundation and American Society of Nephrology (ASN) Task Force recommended calculation based on the Chronic Kidney Disease Epidemiology Collaboration (CKD-EPI) equation refit without adjustment for race.       Narrative:      GFR Normal >60  Chronic Kidney Disease <60  Kidney Failure <15      Troponin [[id number]]  (Normal) Collected: 10/13/22 1716    Specimen: Blood Updated: 10/13/22 1748     Troponin T <0.010 ng/mL     Narrative:      Troponin T Reference Range:  <= 0.03 ng/mL-   Negative for AMI  >0.03 ng/mL-     Abnormal for myocardial necrosis.  Clinicians would have to utilize clinical acumen, EKG, Troponin and serial changes to  determine if it is an Acute Myocardial Infarction or myocardial injury due to an underlying chronic condition.       Results may be falsely decreased if patient taking Biotin.      BNP [[id number]]  (Normal) Collected: 10/13/22 1716    Specimen: Blood Updated: 10/13/22 1746     proBNP 436.6 pg/mL     Narrative:      Among patients with dyspnea, NT-proBNP is highly sensitive for the detection of acute congestive heart failure. In addition NT-proBNP of <300 pg/ml effectively rules out acute congestive heart failure with 99% negative predictive value.    Results may be falsely decreased if patient taking Biotin.      BNP [[id number]]  (Normal) Collected: 10/13/22 1628    Specimen: Blood Updated: 10/13/22 1700     proBNP 505.8 pg/mL     Narrative:      Among patients with dyspnea, NT-proBNP is highly sensitive for the detection of acute congestive heart failure. In addition NT-proBNP of <300 pg/ml effectively rules out acute congestive heart failure with 99% negative predictive value.    Results may be falsely decreased if patient taking Biotin.      CBC & Differential [[id number]]  (Abnormal) Collected: 10/13/22 1628    Specimen: Blood Updated: 10/13/22 1640    Narrative:      The following orders were created for panel order CBC & Differential.  Procedure                               Abnormality         Status                     ---------                               -----------         ------                     CBC Auto Differential[[id number]]        Abnormal            Final result                 Please view results for these tests on the individual orders.    CBC Auto Differential [[id number]]  (Abnormal) Collected: 10/13/22 1628    Specimen: Blood Updated: 10/13/22 1640     WBC 7.20 10*3/mm3      RBC 4.42 10*6/mm3      Hemoglobin 13.5 g/dL      Hematocrit 40.0 %      MCV 90.5 fL      MCH 30.6 pg      MCHC 33.8 g/dL      RDW 15.0 %      RDW-SD 48.1 fl      MPV 8.8 fL      Platelets 206 10*3/mm3      Neutrophil %  59.2 %      Lymphocyte % 30.7 %      Monocyte % 4.9 %      Eosinophil % 4.0 %      Basophil % 1.2 %      Neutrophils, Absolute 4.20 10*3/mm3      Lymphocytes, Absolute 2.20 10*3/mm3      Monocytes, Absolute 0.30 10*3/mm3      Eosinophils, Absolute 0.30 10*3/mm3      Basophils, Absolute 0.10 10*3/mm3      nRBC 0.0 /100 WBC           Imaging Results (Last 24 Hours)     Procedure Component Value Units Date/Time    XR Chest 1 View [[id number]] Collected: 10/13/22 1621     Updated: 10/13/22 1624    Narrative:      DATE OF EXAM:  10/13/2022 4:16 PM     PROCEDURE:  XR CHEST 1 VW-     INDICATIONS:  CHF/COPD Protocol     COMPARISON:  08/16/2022     TECHNIQUE:   Single radiographic AP view of the chest was obtained.     FINDINGS:  The heart looks enlarged. The lungs seem relatively clear. There are no  pleural effusions.        Impression:      Cardiomegaly.     Electronically Signed By-Alfredo Salinas MD On:10/13/2022 4:22 PM  This report was finalized on [id number] by  Alfredo Salinas MD.          EKG      I personally viewed and interpreted the patient's EKG/Telemetry data:    ECHOCARDIOGRAM:      STRESS MYOVIEW:    CARDIAC CATHETERIZATION:    OTHER:         Assessment & Plan     Principal Problem:    Bradycardia with 41-50 beats per minute  Active Problems:    JEFF on CPAP    HTN (hypertension)    Left arm swelling    GERD without esophagitis    COPD (chronic obstructive pulmonary disease) (HCC)      Patient presented with bradycardia and has mild dizziness  Patient is on beta-blockers metoprolol which has been held patient's heart rates are better  Patient is being ruled out for MI by get enzymes  Patient an echocardiogram recently which showed normal LV function and hence no need to repeat echocardiogram  Patient with also a TSH level checked  Patient bradycardia could be secondary to beta-blockers and also vagal response with pain from the broken arm  We will continue to monitor his heart rate.    I discussed the patients  findings and my recommendations with patient and nurse    Brian Ayala MD  10/14/22  13:58 EDT

## 2022-10-14 NOTE — CASE MANAGEMENT/SOCIAL WORK
Discharge Planning Assessment  [facility]     Patient Name: Christopher Talbert  MRN: [id number]  Today's Date: 10/14/2022    Admit Date: 10/13/2022    Plan: DC Plan: Anticipate routine home. New PCP appt added to AVS.   Discharge Needs Assessment     Row Name 10/14/22 1356       Living Environment    People in Home spouse    Name(s) of People in Home Spouse- Yeimi    Current Living Arrangements home    Primary Care Provided by self    Provides Primary Care For no one    Family Caregiver if Needed spouse    Family Caregiver Names Daughter- Rebeca    Quality of Family Relationships supportive;involved;helpful    Able to Return to Prior Arrangements yes       Resource/Environmental Concerns    Resource/Environmental Concerns none    Transportation Concerns none       Transition Planning    Patient/Family Anticipates Transition to home with family    Patient/Family Anticipated Services at Transition none    Transportation Anticipated family or friend will provide;car, drives self       Discharge Needs Assessment    Readmission Within the Last 30 Days no previous admission in last 30 days    Equipment Currently Used at Home cpap  Patient reported he has a home cpap machine but cannot recall name of DME company in which it was supplied through.    Concerns to be Addressed care coordination/care conferences;discharge planning    Anticipated Changes Related to Illness none    Equipment Needed After Discharge none    Provided Post Acute Provider List? N/A               Discharge Plan     Row Name 10/14/22 2823       Plan    Plan DC Plan: Anticipate routine home. New PCP appt added to AVS.    Patient/Family in Agreement with Plan yes    Plan Comments CM met with patient at bedside to discuss dc planning. Patient reported no trouble affording medications. He reported that he recently moved to this area in July and did not have a PCP set up. Patient agreeable to having new PCP appt arranged. He reported that he went to OP PT at [facility] [place] for swollen arm ad they did not do therapy d/t his heart rate being in the 30s. He reported that he followed up at a medical clinic the next morning then was sent to the ER. Email address obtained from patient. Patient also requested new PCP for his spouse, Yeimi. CM contacted [facility]. Provided patient's email address of [email or web address]. Appts arranged for Thursday, 10/20/22 at 11:00am and 11:45am for both patient and spouse. Information added to AVS for discharge.            Demographic Summary     Row Name 10/14/22 1355       General Information    Admission Type observation    Required Notices Provided Observation Status Notice  BEYER 10/13/22 per Registration    Referral Source admission list    Reason for Consult discharge planning    Preferred Language English       Contact Information    Permission Granted to Share Info With                Functional Status     Row Name 10/14/22 1355       Functional Status    Usual Activity Tolerance good    Current Activity Tolerance good       Functional Status, IADL    Medications independent    Meal Preparation independent    Housekeeping independent    Laundry independent    Shopping independent              Met with patient in room wearing PPE: mask.      Maintained distance greater than six feet and spent less than 15 minutes in the room.      Jil Toledo RN     Office Phone: [phone]  Office Cell: [phone]

## 2022-10-14 NOTE — PLAN OF CARE
Goal Outcome Evaluation:  Plan of Care Reviewed With: patient        Progress: no change  Outcome Evaluation: Pt is a 80 YO M admitted with bradycardia, HR in the 30s upon admission. Pt with recent hx (Aug 2022) or R radius frx. Pt states he lives at home with spouse, generally is very independent and active. This date HR remainded 58-70 throughout evaluation. Pt. completes functional transfers w/o assist this date, increased mod A for all LB ADLs as pt. still c/o LUE pain following fx. Pt. states spouse assists at home w/ ADLs PRN , recent started OP therapy. Pt. educated on HEP AAROM to increased wrist flexion/ext and FMC. Recommend continued OP therapy.

## 2022-10-14 NOTE — PLAN OF CARE
Goal Outcome Evaluation:  Pt waiting for US on Larm to r/o clot. Arm is swollen and painful. HR 54 up from 30's-40's /83. 2LNC at night.          Problem: Adult Inpatient Plan of Care  Goal: Plan of Care Review  Outcome: Met  Goal: Patient-Specific Goal (Individualized)  Outcome: Met  Goal: Absence of Hospital-Acquired Illness or Injury  Outcome: Met  Intervention: Prevent Skin Injury  Intervention: Prevent and Manage VTE (Venous Thromboembolism) Risk  Goal: Optimal Comfort and Wellbeing  Outcome: Met  Intervention: Provide Person-Centered Care  Goal: Readiness for Transition of Care  Outcome: Met  Intervention: Mutually Develop Transition Plan     Problem: Fall Injury Risk  Goal: Absence of Fall and Fall-Related Injury  Outcome: Met     Problem: Hypertension Comorbidity  Goal: Blood Pressure in Desired Range  Outcome: Met     Problem: Obstructive Sleep Apnea Risk or Actual Comorbidity Management  Goal: Unobstructed Breathing During Sleep  Outcome: Met              Progress: no change

## 2022-10-15 VITALS
WEIGHT: 195.33 LBS | DIASTOLIC BLOOD PRESSURE: 62 MMHG | HEIGHT: 71 IN | RESPIRATION RATE: 16 BRPM | SYSTOLIC BLOOD PRESSURE: 172 MMHG | BODY MASS INDEX: 27.35 KG/M2 | OXYGEN SATURATION: 99 % | TEMPERATURE: 97.7 F | HEART RATE: 64 BPM

## 2022-10-15 LAB
ANION GAP SERPL CALCULATED.3IONS-SCNC: 12 MMOL/L (ref 5–15)
BASOPHILS # BLD AUTO: 0.1 10*3/MM3 (ref 0–0.2)
BASOPHILS NFR BLD AUTO: 1.4 % (ref 0–1.5)
BUN SERPL-MCNC: 13 MG/DL (ref 8–23)
BUN/CREAT SERPL: 11.3 (ref 7–25)
CALCIUM SPEC-SCNC: 8.5 MG/DL (ref 8.6–10.5)
CHLORIDE SERPL-SCNC: 105 MMOL/L (ref 98–107)
CO2 SERPL-SCNC: 22 MMOL/L (ref 22–29)
CREAT SERPL-MCNC: 1.15 MG/DL (ref 0.76–1.27)
DEPRECATED RDW RBC AUTO: 50.8 FL (ref 37–54)
EGFRCR SERPLBLD CKD-EPI 2021: 64.7 ML/MIN/1.73
EOSINOPHIL # BLD AUTO: 0.3 10*3/MM3 (ref 0–0.4)
EOSINOPHIL NFR BLD AUTO: 5.4 % (ref 0.3–6.2)
ERYTHROCYTE [DISTWIDTH] IN BLOOD BY AUTOMATED COUNT: 15.3 % (ref 12.3–15.4)
GLUCOSE SERPL-MCNC: 101 MG/DL (ref 65–99)
HCT VFR BLD AUTO: 38.6 % (ref 37.5–51)
HGB BLD-MCNC: 12.4 G/DL (ref 13–17.7)
LYMPHOCYTES # BLD AUTO: 1.7 10*3/MM3 (ref 0.7–3.1)
LYMPHOCYTES NFR BLD AUTO: 29.3 % (ref 19.6–45.3)
MCH RBC QN AUTO: 30.5 PG (ref 26.6–33)
MCHC RBC AUTO-ENTMCNC: 32.2 G/DL (ref 31.5–35.7)
MCV RBC AUTO: 94.6 FL (ref 79–97)
MONOCYTES # BLD AUTO: 0.4 10*3/MM3 (ref 0.1–0.9)
MONOCYTES NFR BLD AUTO: 7.2 % (ref 5–12)
NEUTROPHILS NFR BLD AUTO: 3.4 10*3/MM3 (ref 1.7–7)
NEUTROPHILS NFR BLD AUTO: 56.7 % (ref 42.7–76)
NRBC BLD AUTO-RTO: 0.1 /100 WBC (ref 0–0.2)
PLATELET # BLD AUTO: 178 10*3/MM3 (ref 140–450)
PMV BLD AUTO: 9.2 FL (ref 6–12)
POTASSIUM SERPL-SCNC: 4.1 MMOL/L (ref 3.5–5.2)
RBC # BLD AUTO: 4.08 10*6/MM3 (ref 4.14–5.8)
SODIUM SERPL-SCNC: 139 MMOL/L (ref 136–145)
WBC NRBC COR # BLD: 5.9 10*3/MM3 (ref 3.4–10.8)

## 2022-10-15 PROCEDURE — 94799 UNLISTED PULMONARY SVC/PX: CPT

## 2022-10-15 PROCEDURE — 80048 BASIC METABOLIC PNL TOTAL CA: CPT | Performed by: HOSPITALIST

## 2022-10-15 PROCEDURE — 94761 N-INVAS EAR/PLS OXIMETRY MLT: CPT

## 2022-10-15 PROCEDURE — 94664 DEMO&/EVAL PT USE INHALER: CPT

## 2022-10-15 PROCEDURE — 99214 OFFICE O/P EST MOD 30 MIN: CPT | Performed by: INTERNAL MEDICINE

## 2022-10-15 PROCEDURE — 85025 COMPLETE CBC W/AUTO DIFF WBC: CPT | Performed by: HOSPITALIST

## 2022-10-15 PROCEDURE — G0378 HOSPITAL OBSERVATION PER HR: HCPCS

## 2022-10-15 RX ORDER — ENOXAPARIN SODIUM 100 MG/ML
40 INJECTION SUBCUTANEOUS EVERY 24 HOURS
Status: DISCONTINUED | OUTPATIENT
Start: 2022-10-15 | End: 2022-10-15 | Stop reason: HOSPADM

## 2022-10-15 RX ADMIN — AMLODIPINE BESYLATE 5 MG: 5 TABLET ORAL at 08:17

## 2022-10-15 RX ADMIN — IPRATROPIUM BROMIDE 0.5 MG: 0.5 SOLUTION RESPIRATORY (INHALATION) at 08:05

## 2022-10-15 RX ADMIN — ATORVASTATIN CALCIUM 20 MG: 20 TABLET, FILM COATED ORAL at 08:17

## 2022-10-15 RX ADMIN — SODIUM CHLORIDE 100 ML/HR: 9 INJECTION, SOLUTION INTRAVENOUS at 08:18

## 2022-10-15 RX ADMIN — BUDESONIDE AND FORMOTEROL FUMARATE DIHYDRATE 2 PUFF: 80; 4.5 AEROSOL RESPIRATORY (INHALATION) at 08:10

## 2022-10-15 RX ADMIN — Medication 1000 UNITS: at 08:17

## 2022-10-15 RX ADMIN — IPRATROPIUM BROMIDE 0.5 MG: 0.5 SOLUTION RESPIRATORY (INHALATION) at 11:26

## 2022-10-15 RX ADMIN — Medication 3 ML: at 08:16

## 2022-10-15 RX ADMIN — PANTOPRAZOLE SODIUM 40 MG: 40 TABLET, DELAYED RELEASE ORAL at 08:17

## 2022-10-15 RX ADMIN — POTASSIUM CHLORIDE 20 MEQ: 1500 TABLET, EXTENDED RELEASE ORAL at 08:17

## 2022-10-15 NOTE — PROGRESS NOTES
Cardiology Progress Note    Patient Identification:  Name: Christopher Talbert  Age: 81 y.o.  Sex: male  :  1940  MRN: [id number]                 Follow Up / Chief Complaint: Bradycardia  Chief Complaint   Patient presents with   • Slow Heart Rate       Interval History: Patient's beta-blockers were held and bradycardia is improved       Subjective: Patient seen and examined.  Chart reviewed.  Labs reviewed.  Discussed with RN taking care of patient      Objective:  10/14/2022: TSH was 3.03    Assessment:    Bradycardia  Hypertension  JEFF  COPD        Plan:    Patient was bradycardic  Beta-blockers were held.  Patient's blood bradycardia is resolved with heart rates over 60.  Patient underwent echocardiogram which revealed normal LV systolic function  TSH 10/14/2022 was normal at 1.4  Advised patient to check blood pressure at home.  We will follow-up closely as outpatient and monitor blood pressure and rhythm        Copied text in this portion of the note has been reviewed and is accurate as of 10/15/2022    Past Medical History:  Past Medical History:   Diagnosis Date   • COPD (chronic obstructive pulmonary disease) (HCC)    • Hypertension    • 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HCC)      Past Surgical History:  Past Surgical History:   Procedure Laterality Date   • APPENDECTOMY     • LUNG REMOVAL, PARTIAL     • SPINE SURGERY          Social History:   Social History     Tobacco Use   • Smoking status: Former   • Smokeless tobacco: Never   Substance Use Topics   • Alcohol use: Not on file      Family History:  History reviewed. No pertinent family history.       Allergies:  Allergies   Allergen Reactions   • Penicillins Unknown - High Severity     Scheduled Meds:  amLODIPine, 5 mg, Q24H  atorvastatin, 20 mg, Daily  budesonide-formoterol, 2 puff, BID - RT   And  ipratropium, 0.5 mg, Q6H - RT  cholecalciferol, 1,000 Units, Daily  pantoprazole, 40 mg, QAM  potassium chloride, 20 mEq, Daily  sodium chloride, 3 mL,  "Q12H          Review of Systems:   ROS  Review of Systems   Constitution: Negative for chills and fever.   Cardiovascular: Negative for chest pain and palpitations.   Respiratory: Negative for cough and hemoptysis.    Gastrointestinal: Negative for nausea.        Constitutional:  Temp:  [97.6 °F (36.4 °C)-98.6 °F (37 °C)] 97.6 °F (36.4 °C)  Heart Rate:  [53-82] 65  Resp:  [16-24] 16  BP: (122-172)/(65-86) 168/69    Physical Exam   /69 (BP Location: Right arm, Patient Position: Lying)   Pulse 65   Temp 97.6 °F (36.4 °C) (Oral)   Resp 16   Ht 180.3 cm (71\")   Wt 88.6 kg (195 lb 5.2 oz)   SpO2 100%   BMI 27.24 kg/m²   General:  Appears in no acute distress  Eyes: Sclera is anicteric,  conjunctiva is clear   HEENT:  No JVD. Thyroid not visibly enlarged. No mucosal pallor or cyanosis  Respiratory: Respirations regular and unlabored at rest.  Bilaterally good breath sounds, with good air entry in all fields. No crackles, rubs or wheezes auscultated  Cardiovascular: S1,S2 Regular rate and rhythm. No murmur, rub or gallop auscultated.  . No pretibial pitting edema  Gastrointestinal: Abdomen nondistended, soft  Musculoskeletal:  No abnormal movements  Extremities: No digital clubbing or cyanosis  Skin: Color pink. Skin warm and dry to touch. No rashes  No xanthoma  Neuro: Alert and awake, no lateralizing deficits appreciated    INTAKE AND OUTPUT:    Intake/Output Summary (Last 24 hours) at 10/15/2022 1111  Last data filed at 10/15/2022 1008  Gross per 24 hour   Intake 720 ml   Output 1400 ml   Net -680 ml       Cardiographics  Telemetry: Sinus    ECG:   ECG 12 Lead   Final Result   HEART RATE= 44  bpm   RR Interval= 1364  ms   LA Interval= 162  ms   P Horizontal Axis= -1  deg   P Front Axis= 54  deg   QRSD Interval= 152  ms   QT Interval= 525  ms   QRS Axis= 41  deg   T Wave Axis= 34  deg   - ABNORMAL ECG -   Sinus bradycardia   Probable left atrial enlargement   Right bundle branch block   When compared with " "ECG of 20-Jul-2022 23:58:27,   Significant rate decrease   Electronically Signed By: Jose A Otoole ([email or web address]) 14-Oct-2022 18:02:56   Date and Time of Study: 2022-10-13 15:30:48      SCANNED - TELEMETRY     Final Result      SCANNED - TELEMETRY     Final Result      SCANNED - TELEMETRY     Final Result      SCANNED - TELEMETRY     Final Result        I have personally reviewed EKG    Echocardiogram: Results for orders placed during the hospital encounter of 07/21/22    Adult Transthoracic Echo Complete W/ Cont if Necessary Per Protocol    Interpretation Summary  · Left ventricular ejection fraction appears to be 56 - 60%.  · The right ventricular cavity is mildly dilated.  · No pericardial effusion noted      Lab Review   I have reviewed the labs  Results from last 7 days   Lab Units 10/14/22  0408 10/13/22  1716   TROPONIN T ng/mL <0.010 <0.010     Results from last 7 days   Lab Units 10/14/22  0408   MAGNESIUM mg/dL 1.9     Results from last 7 days   Lab Units 10/15/22  0828   SODIUM mmol/L 139   POTASSIUM mmol/L 4.1   BUN mg/dL 13   CREATININE mg/dL 1.15   CALCIUM mg/dL 8.5*         Results from last 7 days   Lab Units 10/15/22  0828 10/14/22  0408 10/13/22  1628   WBC 10*3/mm3 5.90 5.70 7.20   HEMOGLOBIN g/dL 12.4* 12.3* 13.5   HEMATOCRIT % 38.6 38.0 40.0   PLATELETS 10*3/mm3 178 181 206           RADIOLOGY:  Imaging Results (Last 24 Hours)     ** No results found for the last 24 hours. **                )10/15/2022  MD ANALI Brown Dragon/Transcription:   \"Dictated utilizing Dragon dictation\".   "

## 2022-10-15 NOTE — DISCHARGE SUMMARY
[facility] Medicine Services  DISCHARGE SUMMARY    Patient Name: Christopher Talbert  : 1940  MRN: [id number]    Date of Admission: 10/13/2022  Discharge Diagnosis: Bradycardia  Date of Discharge: 10/15/2022  Primary Care Physician: Provider, No Known      Presenting Problem:   Dizzy spells [R42]  Bradycardia with 41-50 beats per minute [R00.1]  Edema of left upper arm [R60.0]    Active and Resolved Hospital Problems:  Active Hospital Problems    Diagnosis POA   • **Bradycardia with 41-50 beats per minute [R00.1] Yes   • JEFF on CPAP [G47.33, Z99.89] Not Applicable   • HTN (hypertension) [I10] Yes   • Left arm swelling [M79.89] Yes   • GERD without esophagitis [K21.9] Yes   • COPD (chronic obstructive pulmonary disease) ([facility]) [J44.9] Yes      Resolved Hospital Problems   No resolved problems to display.         Hospital Course     Hospital Course:  Chief Complaint: Low heart rate     History of Present Illness: Christopher Talbert is a very pleasant 81 y.o. male who presented to [facility] on 10/13/2022 complaining of a low heart rate.  He he fell and broke his left radius and ulna in August and was going to physical therapy.  Yesterday the physical therapist said he could not exercise because his heart rate was in the 30s.  He reports some occasional lightheadedness but denies any chest pain, dyspnea or syncope.  He reports his fall in August was not due to syncope or lightheadedness.  Today in ED his heart rate has been in the 30s and 40s.  He reports he was placed on a beta-blocker propanolol but has been taking it for several months.  He has a past medical history of hypertension but denies any cardiac disease.  2D echo in August here shows heart failure preserved EF 55 to 60%.  He reports he recently moved here from [place].  He reports he thinks he had a cardiac stress test in the past but it was normal.  He has a past medical history of COPD is a non-smoker and does  not use home oxygen.  He does have hypertension and BP was elevated on admission here he was given amlodipine and hydrochlorothiazide in ED.  He will be admitted for continuous cardiac monitoring and cardiology has been consulted.  Troponin was less than 0.010 proBNP was not elevated and EKG showed bradycardia heart rate 44 with right bundle branch block.  He also complained of swelling in his left upper extremity from above his wrist to past his elbow.  He reports he saw orthopedics and they said that was normal but he reports it is gotten worse.  D-dimer was elevated.  He denies any shortness of air or chest pain.  Left upper extremity Doppler has been ordered and since this cannot be done until a.m. he was given a one-time dose of treatment Lovenox.  He will be admitted for further evaluation and treatment    Hospital course and problem list    Plan:     Bradycardia  Symptomatic  Resolved  Ruled out ACS  Beta-blockers have been held  TSH normal  Cardiology consulted    Elevated D-dimer  Likely due to recent trauma fracture of his left upper extremity  No DVT in the left upper extremity on duplex  No chest pain no shortness of breath no hypoxia no tachycardia to suggest PE  No lower extremity pain and no lower extremity swelling to suggest DVT in the lower extremities.     Hypertension  Continue amlodipine hydrochlorothiazide restart losartan  Beta-blockers have been held       rangel  Resolved with normal saline  ARB and hydrochlorothiazide briefly held will be restarted on discharge     Left arm swelling  Duplex pending to rule out DVT  This is status post fracture which was treated with a cast     COPD  Nebs as needed  Not in exacerbation     GERD  Continue PPI     Dyslipidemia  Continue statin     DVT PUD prophylaxis     Plan discharge home stable condition follow-up with cardiology PCP as an outpatient.        Reasons For Change In Medications and Indications for New Medications:      Day of Discharge     Vital  Signs:  Temp:  [97.6 °F (36.4 °C)-98.6 °F (37 °C)] 97.8 °F (36.6 °C)  Heart Rate:  [52-82] 58  Resp:  [16-24] 18  BP: (122-172)/(65-86) 146/72    Physical Exam:  Physical Exam   Physical Exam   Constitutional:  oriented to person, place, and time. No distress.   HENT:   Head: Normocephalic and atraumatic.   Eyes: Conjunctivae and EOM are normal. Pupils are equal, round, and reactive to light.   Neck: No JVD present. No thyromegaly present.   Cardiovascular: Normal rate, regular rhythm, normal heart sounds and intact distal pulses. Exam reveals no gallop and no friction rub.   No murmur heard.  Pulmonary/Chest: Effort normal and breath sounds normal. No stridor. No respiratory distress.  has no wheezes.  has no rales.  exhibits no tenderness.   Abdominal: Soft. Bowel sounds are normal.  no distension and no mass. There is no tenderness. There is no rebound and no guarding. No hernia.   Musculoskeletal: Normal range of motion.  Mild left upper extremity swelling  Lymphadenopathy:     no cervical adenopathy.   Neurological:  alert and oriented to person, place, and time. No cranial nerve deficit or sensory deficit. exhibits normal muscle tone.   Skin: No rash noted.  not diaphoretic.   Psychiatric:  normal mood and affect.   Vitals reviewed.        Pertinent  and/or Most Recent Results     LAB RESULTS:      Lab 10/15/22  0828 10/14/22  0408 10/13/22  1628   WBC 5.90 5.70 7.20   HEMOGLOBIN 12.4* 12.3* 13.5   HEMATOCRIT 38.6 38.0 40.0   PLATELETS 178 181 206   NEUTROS ABS 3.40  --  4.20   LYMPHS ABS 1.70  --  2.20   MONOS ABS 0.40  --  0.30   EOS ABS 0.30  --  0.30   MCV 94.6 92.9 90.5         Lab 10/15/22  0828 10/14/22  0408 10/13/22  1716   SODIUM 139 140 140   POTASSIUM 4.1 3.5 3.8   CHLORIDE 105 105 106   CO2 22.0 26.0 25.0   ANION GAP 12.0 9.0 9.0   BUN 13 12 11   CREATININE 1.15 1.31* 1.11   EGFR 64.7 55.4* 67.5   GLUCOSE 101* 109* 94   CALCIUM 8.5* 8.8 8.4*   IONIZED CALCIUM  --  1.21  --    MAGNESIUM  --  1.9   --    TSH  --  3.030  --          Lab 10/13/22  1716   TOTAL PROTEIN 6.2   ALBUMIN 3.80   GLOBULIN 2.4   ALT (SGPT) 11   AST (SGOT) 19   BILIRUBIN 0.6   ALK PHOS 108         Lab 10/14/22  0408 10/13/22  1716 10/13/22  1628   PROBNP  --  436.6 505.8   TROPONIN T <0.010 <0.010  --                  Brief Urine Lab Results     None        Microbiology Results (last 10 days)     ** No results found for the last 240 hours. **          XR Chest 1 View    Result Date: 10/13/2022  Impression: Cardiomegaly.  Electronically Signed By-Alfredo Salinas MD On:10/13/2022 4:22 PM This report was finalized on [id number] by  Alfredo Salinas MD.      Results for orders placed during the hospital encounter of 10/13/22    Duplex venous upper extremity left    Interpretation Summary  •  Normal left upper extremity venous duplex scan.      Results for orders placed during the hospital encounter of 10/13/22    Duplex venous upper extremity left    Interpretation Summary  •  Normal left upper extremity venous duplex scan.      Results for orders placed during the hospital encounter of 07/21/22    Adult Transthoracic Echo Complete W/ Cont if Necessary Per Protocol    Interpretation Summary  · Left ventricular ejection fraction appears to be 56 - 60%.  · The right ventricular cavity is mildly dilated.  · No pericardial effusion noted      Labs Pending at Discharge:      Procedures Performed           Consults:   Consults     Date and Time Order Name Status Description    10/14/2022 12:34 AM Cardiology (on-call MD unless specified) Completed     10/13/2022  7:48 PM Inpatient Cardiology Consult      10/13/2022  6:57 PM Hospitalist (on-call MD unless specified)              Discharge Details        Discharge Medications      ASK your doctor about these medications      Instructions Start Date   albuterol sulfate  (90 Base) MCG/ACT inhaler  Commonly known as: PROVENTIL HFA;VENTOLIN HFA;PROAIR HFA   1 puff, Inhalation, Every 4 Hours PRN       amLODIPine 5 MG tablet  Commonly known as: NORVASC   5 mg, Oral, Every 24 Hours Scheduled      atorvastatin 20 MG tablet  Commonly known as: LIPITOR   20 mg, Oral, Daily      benzonatate 100 MG capsule  Commonly known as: TESSALON   100 mg, Oral, 3 Times Daily PRN      cholecalciferol 25 MCG (1000 UT) tablet  Commonly known as: VITAMIN D3   1,000 Units, Oral, Daily      hydroCHLOROthiazide 12.5 MG tablet  Commonly known as: HYDRODIURIL  Ask about: Which instructions should I use?   12.5 mg, Oral, Daily      ipratropium-albuterol 0.5-2.5 mg/3 ml nebulizer  Commonly known as: DUO-NEB   3 mL, Nebulization, Every 4 Hours PRN      irbesartan 300 MG tablet  Commonly known as: AVAPRO   300 mg, Oral, Daily      omeprazole 20 MG capsule  Commonly known as: priLOSEC   20 mg, Oral, Daily      Potassium 99 MG tablet   99 mg, Oral, Daily      propranolol 10 MG tablet  Commonly known as: INDERAL   10 mg, Oral, 2 Times Daily      Trelegy Ellipta 100-62.5-25 MCG/INH inhaler  Generic drug: Fluticasone-Umeclidin-Vilant   1 puff, Inhalation, Daily - RT             Allergies   Allergen Reactions   • Penicillins Unknown - High Severity         Discharge Disposition: Discharged home stable condition follow-up with PCP cardiology as an outpatient      Diet:  Hospital:  Diet Order   Procedures   • Diet Regular         Discharge Activity:   As tolerated      CODE STATUS:  Code Status and Medical Interventions:   Ordered at: 10/13/22 1948     Code Status (Patient has no pulse and is not breathing):    CPR (Attempt to Resuscitate)     Medical Interventions (Patient has pulse or is breathing):    Full Support         No future appointments.        Time spent on Discharge including face to face service: 38 minutes      Signature:   Electronically signed by Bebeto Wilde MD, 10/15/22, 10:01 AM EDT.

## 2022-10-17 NOTE — CASE MANAGEMENT/SOCIAL WORK
Case Management Discharge Note      Final Note: Home      Selected Continued Care - Discharged on 10/15/2022 Admission date: 10/13/2022 - Discharge disposition: Home or Self Care     Transportation Services  Private: Car    Final Discharge Disposition Code: 01 - home or self-care

## 2022-10-24 ENCOUNTER — OFFICE VISIT (OUTPATIENT)
Dept: CARDIOLOGY | Facility: CLINIC | Age: 82
End: 2022-10-24

## 2022-10-24 VITALS
OXYGEN SATURATION: 95 % | SYSTOLIC BLOOD PRESSURE: 146 MMHG | BODY MASS INDEX: 27.3 KG/M2 | DIASTOLIC BLOOD PRESSURE: 71 MMHG | WEIGHT: 195 LBS | HEIGHT: 71 IN | HEART RATE: 72 BPM

## 2022-10-24 DIAGNOSIS — R00.1 BRADYCARDIA: Primary | ICD-10-CM

## 2022-10-24 DIAGNOSIS — I45.10 RIGHT BUNDLE BRANCH BLOCK: ICD-10-CM

## 2022-10-24 DIAGNOSIS — Z82.49 FAMILY HISTORY OF PREMATURE CAD: ICD-10-CM

## 2022-10-24 DIAGNOSIS — I10 ESSENTIAL HYPERTENSION: ICD-10-CM

## 2022-10-24 PROCEDURE — 99214 OFFICE O/P EST MOD 30 MIN: CPT | Performed by: INTERNAL MEDICINE

## 2022-10-24 NOTE — PROGRESS NOTES
Subjective:     Encounter Date:10/24/2022      Patient ID: Christopher Talbert is a 82 y.o. male.    Chief Complaint : Bradycardia    History of Present Illness      Mr. Christopher Talbert has PMH of    Bradycardia  Right bundle branch block  Hypertension  Fall and left radial fracture 7/2022  COPD  Mesothelioma  Partial lung surgery  Spine surgery, appendectomy  Allergies/intolerance to penicillin  Family history negative for premature CAD  Former smoker    Here for hospital follow-up.  Patient denies any chest pain or shortness of breath.  Patient recently was in the hospital 10/13/2022 with dizziness, fall and radial fracture, was found to be bradycardic with heart rate of 30-40s bpm.  He is Inderal was held and patient is doing better.  Patient says he is does not need surgery for his left radial fracture.    Patient's arterial blood pressure is 146/71, heart rate 72 bpm, O2 sat of 95% on room air.    Echocardiogram 7/25/2022: Normal LVEF of 56-60 with mildly dilated RV    Labs from 10/14/2022 revealed normal TSH at 3.03.  Labs from 10/15/2022 revealed normal BMP and CBC with a hemoglobin of 12.4.      Objective:  10/14/2022: TSH was 3.03     Assessment:     Bradycardia  Hypertension  JEFF  COPD           Plan:    Patient presented with dizziness fall and fracture was bradycardic Inderal was held and heart rate has improved.  Patient's blood bradycardia is resolved with heart rates over 60.  Patient underwent echocardiogram which revealed normal LV systolic function  TSH 10/14/2022 was normal .  Advised patient to check blood pressure at home.  Patient reportedly had stress test when he was in [place]  We will follow-up closely  and monitor blood pressure and rhythm       Procedures EKG 10/14/2022 reviewed/interpreted by me reveals sinus bradycardia at the rate of 44 bpm with right bundle branch block.    Copied text in this portion of the note has been reviewed and is accurate as of 10/24/2022  The following  portions of the patient's history were reviewed and updated as appropriate: allergies, current medications, past family history, past medical history, past social history, past surgical history and problem list.    Assessment:         [facility]     Diagnosis Plan   1. Bradycardia        2. Right bundle branch block        3. Essential hypertension        4. Family history of premature CAD               Plan:               Past Medical History:  Past Medical History:   Diagnosis Date   • COPD (chronic obstructive pulmonary disease) (HCC)    • Hypertension    • 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HCC)      Past Surgical History:  Past Surgical History:   Procedure Laterality Date   • APPENDECTOMY     • LUNG REMOVAL, PARTIAL     • SPINE SURGERY        Allergies:  Allergies   Allergen Reactions   • Penicillins Unknown - High Severity     Home Meds:  Current Meds:     Current Outpatient Medications:   •  atorvastatin (LIPITOR) 20 MG tablet, Take 20 mg by mouth Daily., Disp: , Rfl:   •  benzonatate (TESSALON) 100 MG capsule, Take 100 mg by mouth 3 (Three) Times a Day As Needed., Disp: , Rfl:   •  cholecalciferol (VITAMIN D3) 25 MCG (1000 UT) tablet, Take 1,000 Units by mouth Daily., Disp: , Rfl:   •  Fluticasone-Umeclidin-Vilant (Trelegy Ellipta) 100-62.5-25 MCG/INH inhaler, Inhale 1 puff Daily., Disp: , Rfl:   •  omeprazole (priLOSEC) 20 MG capsule, Take 20 mg by mouth Daily., Disp: , Rfl:   •  Potassium 99 MG tablet, Take 99 mg by mouth Daily., Disp: , Rfl:   •  albuterol sulfate  (90 Base) MCG/ACT inhaler, Inhale 1 puff Every 4 (Four) Hours As Needed for Wheezing., Disp: , Rfl:   •  amLODIPine (NORVASC) 5 MG tablet, Take 1 tablet by mouth Daily., Disp: 30 tablet, Rfl: 0  •  hydroCHLOROthiazide (HYDRODIURIL) 12.5 MG tablet, Take 1 tablet by mouth Daily., Disp: , Rfl:   •  ipratropium-albuterol (DUO-NEB) 0.5-2.5 mg/3 ml nebulizer, Take 3 mL by nebulization Every 4 (Four) Hours As Needed for Wheezing or Shortness of Air., Disp: , Rfl:   •   "irbesartan (AVAPRO) 300 MG tablet, Take 300 mg by mouth Daily., Disp: , Rfl:   Social History:   Social History     Tobacco Use   • Smoking status: Former   • Smokeless tobacco: Never   Substance Use Topics   • Alcohol use: Yes     Comment: 1 every few weeks      Family History:  History reviewed. No pertinent family history.           Review of Systems   Constitutional: Negative for malaise/fatigue.   Cardiovascular: Negative for chest pain, leg swelling and palpitations.   Respiratory: Negative for shortness of breath.    Skin: Negative for rash.   Neurological: Negative for dizziness, light-headedness and numbness.     All other systems are negative         Objective:     Physical Exam  /71   Pulse 72   Ht 180.3 cm (71\")   Wt 88.5 kg (195 lb)   SpO2 95%   BMI 27.20 kg/m²   General:  Appears in no acute distress  Eyes: Sclera is anicteric,  conjunctiva is clear   HEENT:  No JVD.  No carotid bruits  Respiratory: Respirations regular and unlabored at rest.  Clear to auscultation  Cardiovascular: S1,S2 Regular rate and rhythm. No murmur, rub or gallop auscultated.   Extremities: No digital clubbing or cyanosis, no edema  Skin: Color pink. Skin warm and dry to touch. No rashes  No xanthoma  Neuro: Alert and awake.    Lab Reviewed:         Zia Whipple MD  10/24/2022 13:27 EDT      EMR Dragon/Transcription:   \"Dictated utilizing Dragon dictation\".        "

## 2022-12-16 ENCOUNTER — TREATMENT (OUTPATIENT)
Dept: PHYSICAL THERAPY | Facility: CLINIC | Age: 82
End: 2022-12-16

## 2022-12-16 DIAGNOSIS — S62.102D CLOSED FRACTURE OF LEFT WRIST WITH ROUTINE HEALING, SUBSEQUENT ENCOUNTER: ICD-10-CM

## 2022-12-16 DIAGNOSIS — M25.532 PAIN IN LEFT WRIST: Primary | ICD-10-CM

## 2022-12-16 PROCEDURE — 97530 THERAPEUTIC ACTIVITIES: CPT | Performed by: PHYSICAL THERAPIST

## 2022-12-16 PROCEDURE — 97110 THERAPEUTIC EXERCISES: CPT | Performed by: PHYSICAL THERAPIST

## 2022-12-16 NOTE — PROGRESS NOTES
Physical Therapy Progress Note    Patient: Christopher Talbert  : 1940  Referring practitioner: Sara Milton MD  Diagnosis/ ICD10 code: Pain in left wrist [M25.532]  Today's Date: 2022    VISIT#: 2    Subjective   Pt reports: he was hospitalized for a couple of days and they checked his heart and everything was ok. He also saw a cardiologist . He is doing fine and doesn't have any SOA or any other issues. His main concern is his wrist . He also saw Dr Borjas for a 2nd opinion .  He still has a hard time using his L hand , can't lift anything with it and it hurts when he uses it.       Objective          Active Range of Motion     Left Elbow   Forearm supination: 35 degrees with pain    Left Wrist   Wrist flexion: 40 degrees with pain  Wrist extension: 35 degrees   Radial deviation: 15 degrees with pain  Ulnar deviation: 20 degrees     Strength/Myotome Testing     Left Wrist/Hand   Wrist extension: 4  Wrist flexion: 4  Radial deviation: 4  Ulnar deviation: 4     (2nd hand position)   Left  strength (2nd hand position) 5 lbs    Right Wrist/Hand      (2nd hand position)   Right  strength (2nd hand position) 70 lbs        See Exercise, Manual, and Modality Logs for complete treatment.     Patient Education: HEP    Assessment & Plan     Assessment    Assessment details: Pt has been cleared by his PCP and Cardiologist. He returns today to resume physical therapy s/p L wrist fx in August. Presents with impaired L wrist /hand ROM and strength and functional use of L hand. Pt tolerated today's rx session well and demonstrated understanding of his HEP.           Progress per Plan of Care            Timed:         Manual Therapy:  6       mins  45345;     Therapeutic Exercise:    30     mins  32453;     Neuromuscular Indra:        mins  36717;    Therapeutic Activity:    10      mins  74737;     Gait Training:           mins  56561;     Ultrasound:          mins  78669;    Ionto                                    mins   91575  Self Care                            mins   27524  Canal repositioning           mins    85793        Timed Treatment:  46    mins   Total Treatment:    46    mins    Travon Flynn PT, CLT  Physical Therapist  [place] License:  # [id number]

## 2022-12-20 ENCOUNTER — TREATMENT (OUTPATIENT)
Dept: PHYSICAL THERAPY | Facility: CLINIC | Age: 82
End: 2022-12-20

## 2022-12-20 DIAGNOSIS — S62.102D CLOSED FRACTURE OF LEFT WRIST WITH ROUTINE HEALING, SUBSEQUENT ENCOUNTER: ICD-10-CM

## 2022-12-20 DIAGNOSIS — M25.532 PAIN IN LEFT WRIST: Primary | ICD-10-CM

## 2022-12-20 PROCEDURE — 97112 NEUROMUSCULAR REEDUCATION: CPT | Performed by: PHYSICAL THERAPIST

## 2022-12-20 PROCEDURE — 97110 THERAPEUTIC EXERCISES: CPT | Performed by: PHYSICAL THERAPIST

## 2022-12-20 PROCEDURE — 97140 MANUAL THERAPY 1/> REGIONS: CPT | Performed by: PHYSICAL THERAPIST

## 2022-12-20 NOTE — PROGRESS NOTES
Physical Therapy Daily Treatment Note    [facility], [address]  [place]  [address] [phone]      Patient: Christopher Talbert   : 1940  Diagnosis/ICD-10 Code:  Pain in left wrist [M25.532]  Referring practitioner: No ref. provider found Dr. Ruiz  Date of Initial Visit: 2022  Today's Date: 2022  Patient seen for 3 sessions.  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      VISIT#: 3      Subjective :  Tolerated last session well.  Doing HEP.  Pain 2/10, anterior wrist. He forgot to take his blood pressure this morning.       Objective     Vitals:  HR 52 BPM, SpO2:  100%,  /70    See Exercise, Manual, and Modality Logs for complete treatment.  Progression as noted.       Patient Education:      Assessment/Plan :  Vitals stable.  Patient able to progress without issue.       Progress per Plan of Care            Timed:         Manual Therapy:  15       mins  22658;     Therapeutic Exercise:   20      mins  83098;     Neuromuscular Indra: 10       mins  63874;    Therapeutic Activity:          mins  85657;     Gait Training:           mins  27122;     Ultrasound:          mins  41143;    Ionto                                   mins   03567  Self Care                            mins   09770  Aquatic                               mins 99598    Un-Timed:  Electrical Stimulation:         mins  52692 (MC );  Traction          mins 89982      Timed Treatment:  45    mins   Total Treatment:    45    mins    Alyson Neal PTA  Physical Therapist Assistant   [place] license:  #[id number]

## 2022-12-28 ENCOUNTER — TREATMENT (OUTPATIENT)
Dept: PHYSICAL THERAPY | Facility: CLINIC | Age: 82
End: 2022-12-28

## 2022-12-28 DIAGNOSIS — S62.102D CLOSED FRACTURE OF LEFT WRIST WITH ROUTINE HEALING, SUBSEQUENT ENCOUNTER: ICD-10-CM

## 2022-12-28 DIAGNOSIS — M25.532 PAIN IN LEFT WRIST: Primary | ICD-10-CM

## 2022-12-28 PROCEDURE — 97112 NEUROMUSCULAR REEDUCATION: CPT | Performed by: PHYSICAL THERAPIST

## 2022-12-28 PROCEDURE — 97110 THERAPEUTIC EXERCISES: CPT | Performed by: PHYSICAL THERAPIST

## 2022-12-28 PROCEDURE — 97140 MANUAL THERAPY 1/> REGIONS: CPT | Performed by: PHYSICAL THERAPIST

## 2022-12-28 NOTE — PROGRESS NOTES
Physical Therapy Daily Treatment Note    [facility], [address]  [place]  [address] [phone]      Patient: Christopher Talbert   : 1940  Diagnosis/ICD-10 Code:  Pain in left wrist [M25.532]  Referring practitioner: No ref. provider found Dr. Ruiz  Date of Initial Visit: 2022  Today's Date: 2022  Patient seen for 4 sessions.  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      VISIT#: 4      Subjective :  No pain currently.  He notes that after last session the skin on his forearm darkened like he was bruised.  He wonders if it was from cocoa butter using during manual.       Objective Patient late.         See Exercise, Manual, and Modality Logs for complete treatment.  Progression as noted.       Patient Education:      Assessment/Plan :  Quick fatigue noted. Wrist flexion and 1st finger ROM remains painful.       Progress per Plan of Care  :  Monitor response continue as appropriate.             Timed:         Manual Therapy:  15       mins  26520;     Therapeutic Exercise:   15      mins  73832;     Neuromuscular Indra: 10       mins  57907;    Therapeutic Activity:          mins  70216;     Gait Training:           mins  22422;     Ultrasound:          mins  50523;    Ionto                                   mins   80555  Self Care                            mins   68942  Aquatic                               mins 78704    Un-Timed:  Electrical Stimulation:         mins  49372 ( );  Traction          mins 55281      Timed Treatment:  40    mins   Total Treatment:    40    mins    Alyson Neal PTA  Physical Therapist Assistant   [place] license:  #[id number]

## 2023-01-04 ENCOUNTER — TREATMENT (OUTPATIENT)
Dept: PHYSICAL THERAPY | Facility: CLINIC | Age: 83
End: 2023-01-04
Payer: MEDICARE

## 2023-01-04 DIAGNOSIS — M25.532 PAIN IN LEFT WRIST: Primary | ICD-10-CM

## 2023-01-04 DIAGNOSIS — S62.102D CLOSED FRACTURE OF LEFT WRIST WITH ROUTINE HEALING, SUBSEQUENT ENCOUNTER: ICD-10-CM

## 2023-01-04 PROCEDURE — 97140 MANUAL THERAPY 1/> REGIONS: CPT | Performed by: PHYSICAL THERAPIST

## 2023-01-04 PROCEDURE — 97112 NEUROMUSCULAR REEDUCATION: CPT | Performed by: PHYSICAL THERAPIST

## 2023-01-04 PROCEDURE — 97110 THERAPEUTIC EXERCISES: CPT | Performed by: PHYSICAL THERAPIST

## 2023-01-04 NOTE — PROGRESS NOTES
Physical Therapy Daily Treatment Note    Patient: Christopher Talbert  : 1940  Referring practitioner: No ref. 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    VISIT#: 5    Subjective   Pt reports: doing ok, no pain at rest but has pain if lift something or put pressure on L hand/ wrist.       Objective     See Exercise, Manual, and Modality Logs for complete treatment.     Patient Education:    Assessment & Plan     Assessment    Assessment details: Good tolerance to today's rx session and progression of her ex program.           Progress per Plan of Care            Timed:         Manual Therapy:    15     mins  37977;     Therapeutic Exercise:   15      mins  27706;     Neuromuscular Indra:   10     mins  79209;    Therapeutic Activity:          mins  10585;     Gait Training:           mins  99603;     Ultrasound:          mins  88108;    Ionto                                   mins   71100  Self Care                            mins   47066  Canal repositioning           mins    10049          Timed Treatment:  40    mins   Total Treatment:    40    mins    Travon Flynn, PT, CLT  Physical Therapist  [place] License:  # [id number]

## 2023-01-06 ENCOUNTER — TREATMENT (OUTPATIENT)
Dept: PHYSICAL THERAPY | Facility: CLINIC | Age: 83
End: 2023-01-06
Payer: MEDICARE

## 2023-01-06 DIAGNOSIS — M25.532 PAIN IN LEFT WRIST: Primary | ICD-10-CM

## 2023-01-06 DIAGNOSIS — S62.102D CLOSED FRACTURE OF LEFT WRIST WITH ROUTINE HEALING, SUBSEQUENT ENCOUNTER: ICD-10-CM

## 2023-01-06 PROCEDURE — 97112 NEUROMUSCULAR REEDUCATION: CPT | Performed by: PHYSICAL THERAPIST

## 2023-01-06 PROCEDURE — 97110 THERAPEUTIC EXERCISES: CPT | Performed by: PHYSICAL THERAPIST

## 2023-01-06 PROCEDURE — 97140 MANUAL THERAPY 1/> REGIONS: CPT | Performed by: PHYSICAL THERAPIST

## 2023-01-06 NOTE — PROGRESS NOTES
Physical Therapy Daily Treatment Note    [facility], [address]  [place]  [address]  [phone]      Patient: Christopher Talbert   : 1940  Diagnosis/ICD-10 Code:  Pain in left wrist [M25.532]  Referring practitioner: No ref. provider found Dr. Ruiz  Date of Initial Visit: 2023  Today's Date: 2023  Patient seen for 6 sessions.  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      VISIT#: 6      Subjective :  Pt. Pain 1/10 ulnar side of wrist.  Pain at worst 2/10.   He is still sore from previous session.  He would like to have 2 days rest in between PT sessions vs. Just 1 day.  He is pleased with progress.       Objective       Patient Education:      Assessment/Plan :  Some ther ex decreased due to patient fatigue/muscle soreness. Pt. Making good progress. He has met 5/6 short term goals and 1/6 long term goals.     Goals  Plan Goals: STGs to be met in 4 weeks:  --  Obtain objective measures for ROM/strength/edema at first follow-up visit after eval (and adjust/add to goals accordingly) pending HR is in stable range/medical clearance.-MET  --  Require </= 3 cues with HEP performance.-MET  --  Pain levels at worst </= 4/10.-MET  --  Pt to reduce frequency of wearing brace to 50% of time per MD instruction without complications.-MET  --  Quick DASH score </= 50% disability.  --  Be able to lift cell phone with LUE without pain and no brace.-MET    LTGs to be met by end of POC:  --  Pt to completely d/c use of brace per MD approval without complications.-MET  --  Require no cues with HEP performance for d/c planning.  --  Pain levels at worst </= 2/10.  --  Quick DASH score </= 50% disability.  --  Be able to open jar with BUEs without L wrist pain and no brace.  --  Edema measures of LUE = to that of  BON.    Progress per Plan of Care  :  Monitor response continue as appropriate.             Timed:         Manual Therapy:  15       mins  99576;     Therapeutic Exercise:   15      mins  13322;     Neuromuscular Indra: 10       mins  36839;    Therapeutic Activity:          mins  10219;     Gait Training:           mins  58805;     Ultrasound:          mins  40734;    Ionto                                   mins   94640  Self Care                            mins   63414  Aquatic                               mins 34530    Un-Timed:  Electrical Stimulation:         mins  47960 ( );  Traction          mins 64229      Timed Treatment:  40    mins   Total Treatment:    40    mins    Alyson Neal PTA  Physical Therapist Assistant   [place] license:  #[id number]

## 2023-01-10 ENCOUNTER — TREATMENT (OUTPATIENT)
Dept: PHYSICAL THERAPY | Facility: CLINIC | Age: 83
End: 2023-01-10
Payer: MEDICARE

## 2023-01-10 DIAGNOSIS — S62.102D CLOSED FRACTURE OF LEFT WRIST WITH ROUTINE HEALING, SUBSEQUENT ENCOUNTER: ICD-10-CM

## 2023-01-10 DIAGNOSIS — M25.532 PAIN IN LEFT WRIST: Primary | ICD-10-CM

## 2023-01-10 PROCEDURE — 97110 THERAPEUTIC EXERCISES: CPT | Performed by: PHYSICAL THERAPIST

## 2023-01-10 PROCEDURE — 97140 MANUAL THERAPY 1/> REGIONS: CPT | Performed by: PHYSICAL THERAPIST

## 2023-01-10 PROCEDURE — 97112 NEUROMUSCULAR REEDUCATION: CPT | Performed by: PHYSICAL THERAPIST

## 2023-01-10 NOTE — PROGRESS NOTES
Re-Assessment / Re-Certification        Patient: Christopher Talbert   : 1940  Diagnosis/ICD-10 Code:  Pain in left wrist [M25.532]  Referring practitioner: Sara Milton MD  Date of Initial Visit: Type: THERAPY  Noted: 10/12/2022  Today's Date: 1/10/2023  Patient seen for 7 sessions      Subjective:   Christopher Talbert reports: see below  Subjective Questionnaire: QuickDASH: 41%  Clinical Progress: improved  Home Program Compliance: Yes  Treatment has included: therapeutic exercise, neuromuscular re-education, manual therapy and therapeutic activity     s/p closed fracture of left wrist 22   PRECAUTIONS:  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 Overall doing better and feels he is improving. His hand and wrist is getting stronger.     Pain  Current pain ratin  At best pain ratin  At worst pain ratin         Objective          Active Range of Motion     Left Elbow   Forearm supination: 40 degrees with pain    Left Wrist   Wrist flexion: 50 degrees   Wrist extension: 45 degrees   Radial deviation: 20 degrees   Ulnar deviation: 20 degrees     Strength/Myotome Testing     Left Wrist/Hand   Wrist extension: 4+  Wrist flexion: 4+  Radial deviation: 4  Ulnar deviation: 4     (2nd hand position)   Left  strength (2nd hand position) 15 lbs    Swelling     Left Wrist/Hand   Circumference wrist: 18.5 cm    Right Wrist/Hand   Circumference wrist: 18 cm      Assessment & Plan     Assessment    Assessment details: Pt is s/p closed fracture of left wrist 22. He has been seen for 7 sessions. He is making steady progress with improved L wrist/ hand ROM and strength, and improved functional use of L UE.   All STGs and 3/6 LTGs met so far. Making progress towards remaining LTGs.   DASH score: 41% ( was 68%)    Pt will benefit  from continued therapy to address remaining impairments and max function.     Goals  Plan Goals:  STGs to be met in 4 weeks:  --  Obtain objective measures for ROM/strength/edema at first follow-up visit after eval (and adjust/add to goals accordingly) pending HR is in stable range/medical clearance.-MET  --  Require </= 3 cues with HEP performance.-MET  --  Pain levels at worst </= 4/10.-MET  --  Pt to reduce frequency of wearing brace to 50% of time per MD instruction without complications.-MET  --  Quick DASH score </= 50% disability.- MET  --  Be able to lift cell phone with LUE without pain and no brace.-MET    LTGs to be met by end of POC:  --  Pt to completely d/c use of brace per MD approval without complications.-MET  --  Require no cues with HEP performance for d/c planning.  --  Pain levels at worst </= 2/10.- MET  --  Quick DASH score </= 50% disability.- MET  --  Be able to open jar with BUEs without L wrist pain and no brace.  --  Edema measures of LUE = to that of RUE.    Plan  Therapy options: will be seen for skilled therapy services  Planned therapy interventions: functional ROM exercises, home exercise program, manual therapy, neuromuscular re-education, strengthening and therapeutic activities  Frequency: 2x week  Duration in weeks: 12  Treatment plan discussed with: patient      Progress toward previous goals: Partially Met        Recommendations: Continue as planned  Certification Period: 4/9/2023  Prognosis to achieve goals: good    PT Signature: Travon Flynn PT, CLT  [place] License number:  [id number]    Based upon review of the patient's progress and continued therapy plan, it is my medical opinion that Christopher Talbert should continue physical therapy treatment at [facility] [address] [facility] PHYSICAL TH[facility]APY  [address] [place] [address] [place] [address] [phone].    Signature: __________________________________    Please sign and return via fax to [phone].. Thank you, [facility], [facility].    Timed:         Manual Therapy:   10      mins  48500;     Therapeutic Exercise:   20      mins  62694;     Neuromuscular Indra:  15      mins  17642;    Therapeutic Activity:          mins  73455;     Gait Training:           mins  87854;     Ultrasound:          mins  99107;    Ionto                                   mins   04282  Self Care                            mins   87068          Timed Treatment:  45    mins   Total Treatment:    45    mins

## 2023-01-12 ENCOUNTER — TREATMENT (OUTPATIENT)
Dept: PHYSICAL THERAPY | Facility: CLINIC | Age: 83
End: 2023-01-12
Payer: MEDICARE

## 2023-01-12 DIAGNOSIS — S62.102D CLOSED FRACTURE OF LEFT WRIST WITH ROUTINE HEALING, SUBSEQUENT ENCOUNTER: ICD-10-CM

## 2023-01-12 DIAGNOSIS — M25.532 PAIN IN LEFT WRIST: Primary | ICD-10-CM

## 2023-01-12 PROCEDURE — 97110 THERAPEUTIC EXERCISES: CPT | Performed by: PHYSICAL THERAPIST

## 2023-01-12 PROCEDURE — 97112 NEUROMUSCULAR REEDUCATION: CPT | Performed by: PHYSICAL THERAPIST

## 2023-01-12 PROCEDURE — 97140 MANUAL THERAPY 1/> REGIONS: CPT | Performed by: PHYSICAL THERAPIST

## 2023-01-12 NOTE — PROGRESS NOTES
Physical Therapy Daily Treatment Note    Patient: Christopher Talbert  : 1940  Referring practitioner: No ref. provider found  Diagnosis/ ICD10 code: Pain in left wrist [M25.532]  Today's Date: 2023    VISIT#: 8    Subjective   Pt reports: his L wrist has been sore since last visit. Rates the pain 2/10 presently. He is using his L hand more with daily activities and is able to open the car door now.       Objective     See Exercise, Manual, and Modality Logs for complete treatment.     Patient Education:    Assessment & Plan     Assessment    Assessment details: Tolerated today's rx well, no inc pain during or at conclusion of today's session.           Progress per Plan of Care            Timed:         Manual Therapy:   10      mins  50564;     Therapeutic Exercise:    20     mins  98387;     Neuromuscular Indra:   15     mins  89467;    Therapeutic Activity:          mins  99878;     Gait Training:           mins  53364;     Ultrasound:          mins  19081;    Ionto                                   mins   20753  Self Care                            mins   83907  Canal repositioning           mins    65214          Timed Treatment:   45   mins   Total Treatment:     45   mins    Travon Flynn PT, CLT  Physical Therapist  [place] License:  # [id number]

## 2023-01-17 ENCOUNTER — TREATMENT (OUTPATIENT)
Dept: PHYSICAL THERAPY | Facility: CLINIC | Age: 83
End: 2023-01-17
Payer: MEDICARE

## 2023-01-17 DIAGNOSIS — S62.102D CLOSED FRACTURE OF LEFT WRIST WITH ROUTINE HEALING, SUBSEQUENT ENCOUNTER: ICD-10-CM

## 2023-01-17 DIAGNOSIS — M25.532 PAIN IN LEFT WRIST: Primary | ICD-10-CM

## 2023-01-17 PROCEDURE — 97112 NEUROMUSCULAR REEDUCATION: CPT | Performed by: PHYSICAL THERAPIST

## 2023-01-17 PROCEDURE — 97110 THERAPEUTIC EXERCISES: CPT | Performed by: PHYSICAL THERAPIST

## 2023-01-17 PROCEDURE — 97140 MANUAL THERAPY 1/> REGIONS: CPT | Performed by: PHYSICAL THERAPIST

## 2023-01-17 NOTE — PROGRESS NOTES
Physical Therapy Daily Treatment Note    Patient: Christopher Talbert  : 1940  Referring practitioner: Sara Milton MD  Diagnosis/ ICD10 code: Pain in left wrist [M25.532]  Today's Date: 2023    VISIT#: 9    Subjective   Pt reports: did ok after last rx session. Went to the Grocery store on Sat and carried the grocery bags on L forearm and it bruised really bad the next day.       Objective     See Exercise, Manual, and Modality Logs for complete treatment.     Patient Education:    Assessment & Plan     Assessment    Assessment details: Extensive bruising over L FA from carrying grocery bags. Good tolerance to today's rx session.           Progress per Plan of Care            Timed:         Manual Therapy:    10     mins  56808;     Therapeutic Exercise:   20      mins  81756;     Neuromuscular Indra:  15      mins  92360;    Therapeutic Activity:          mins  50326;     Gait Training:           mins  42574;     Ultrasound:          mins  87589;    Ionto                                   mins   04694  Self Care                            mins   89941  Canal repositioning           mins    31596          Timed Treatment:  45    mins   Total Treatment:    45    mins    Travon Flynn PT, CLT  Physical Therapist  [place] License:  # [id number]

## 2023-01-26 ENCOUNTER — TREATMENT (OUTPATIENT)
Dept: PHYSICAL THERAPY | Facility: CLINIC | Age: 83
End: 2023-01-26
Payer: MEDICARE

## 2023-01-26 DIAGNOSIS — S62.102D CLOSED FRACTURE OF LEFT WRIST WITH ROUTINE HEALING, SUBSEQUENT ENCOUNTER: ICD-10-CM

## 2023-01-26 DIAGNOSIS — M25.532 PAIN IN LEFT WRIST: Primary | ICD-10-CM

## 2023-01-26 PROCEDURE — 97110 THERAPEUTIC EXERCISES: CPT | Performed by: PHYSICAL THERAPIST

## 2023-01-26 PROCEDURE — 97112 NEUROMUSCULAR REEDUCATION: CPT | Performed by: PHYSICAL THERAPIST

## 2023-01-26 PROCEDURE — 97140 MANUAL THERAPY 1/> REGIONS: CPT | Performed by: PHYSICAL THERAPIST

## 2023-01-31 ENCOUNTER — TREATMENT (OUTPATIENT)
Dept: PHYSICAL THERAPY | Facility: CLINIC | Age: 83
End: 2023-01-31
Payer: MEDICARE

## 2023-01-31 DIAGNOSIS — M25.532 PAIN IN LEFT WRIST: Primary | ICD-10-CM

## 2023-01-31 DIAGNOSIS — S62.102D CLOSED FRACTURE OF LEFT WRIST WITH ROUTINE HEALING, SUBSEQUENT ENCOUNTER: ICD-10-CM

## 2023-01-31 PROCEDURE — 97110 THERAPEUTIC EXERCISES: CPT | Performed by: PHYSICAL THERAPIST

## 2023-01-31 PROCEDURE — 97140 MANUAL THERAPY 1/> REGIONS: CPT | Performed by: PHYSICAL THERAPIST

## 2023-01-31 PROCEDURE — 97112 NEUROMUSCULAR REEDUCATION: CPT | Performed by: PHYSICAL THERAPIST

## 2023-01-31 NOTE — PROGRESS NOTES
Physical Therapy Daily Treatment Note    [facility] [facility] [address], [address]  [place]  [address] [phone]      Patient: Christopher Talbert   : 1940  Diagnosis/ICD-10 Code:  Pain in left wrist [M25.532]  Referring practitioner: No ref. provider found Dr. Ruiz  Date of Initial Visit: 2023  Today's Date: 2023  Patient seen for 11 sessions.  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      VISIT#: 11      Subjective :  L anterior wrist sore 4/10, comes and goes. The more he uses it, the more he feels eat.     Objective       Patient Education:      Assessment/Plan :  Fatigue noted right UE musculature during and after treatment.       Progress per Plan of Care  :  Monitor response continue as appropriate.             Timed:         Manual Therapy:  10       mins  49494;     Therapeutic Exercise:   20      mins  44702;     Neuromuscular Indra: 15      mins  37469;    Therapeutic Activity:          mins  59973;     Gait Training:           mins  38065;     Ultrasound:          mins  31636;    Ionto                                   mins   04217  Self Care                            mins   27372  Aquatic                               mins 40971    Un-Timed:  Electrical Stimulation:         mins  25438 ( );  Traction          mins 65412      Timed Treatment:  45    mins   Total Treatment:    45    mins    Alyson Neal PTA  Physical Therapist Assistant   [place] license:  #[id number]

## 2023-02-03 ENCOUNTER — TREATMENT (OUTPATIENT)
Dept: PHYSICAL THERAPY | Facility: CLINIC | Age: 83
End: 2023-02-03
Payer: MEDICARE

## 2023-02-03 DIAGNOSIS — S62.102D CLOSED FRACTURE OF LEFT WRIST WITH ROUTINE HEALING, SUBSEQUENT ENCOUNTER: ICD-10-CM

## 2023-02-03 DIAGNOSIS — M25.532 PAIN IN LEFT WRIST: Primary | ICD-10-CM

## 2023-02-03 PROCEDURE — 97140 MANUAL THERAPY 1/> REGIONS: CPT | Performed by: PHYSICAL THERAPIST

## 2023-02-03 PROCEDURE — 97110 THERAPEUTIC EXERCISES: CPT | Performed by: PHYSICAL THERAPIST

## 2023-02-03 PROCEDURE — 97112 NEUROMUSCULAR REEDUCATION: CPT | Performed by: PHYSICAL THERAPIST

## 2023-02-03 NOTE — PROGRESS NOTES
Physical Therapy Daily Treatment Note    [facility] [facility], [address]  [place]  [address] [phone]      Patient: Christopher Talbert   : 1940  Diagnosis/ICD-10 Code:  Pain in left wrist [M25.532]  Referring practitioner: No ref. provider found Dr. Ruiz  Date of Initial Visit: 2/3/2023  Today's Date: 2/3/2023  Patient seen for 12 sessions.  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      VISIT#: 12      Subjective :  Left wrist and hand sore and stiff, 4/10.      Objective       Patient Education:      Assessment/Plan :  Stiffness decreased after treatment.       Progress per Plan of Care  :  Monitor response continue as appropriate.             Timed:         Manual Therapy:  10       mins  10848;     Therapeutic Exercise:   20      mins  78071;     Neuromuscular Indra: 15      mins  12412;    Therapeutic Activity:          mins  64987;     Gait Training:           mins  27378;     Ultrasound:          mins  85415;    Ionto                                   mins   63101  Self Care                            mins   11190  Aquatic                               mins 66707    Un-Timed:  Electrical Stimulation:         mins  03474 ( );  Traction          mins 79783      Timed Treatment:  45    mins   Total Treatment:    45    mins    Alyson Neal PTA  Physical Therapist Assistant   [place] license:  #[id number]

## 2023-02-07 ENCOUNTER — TREATMENT (OUTPATIENT)
Dept: PHYSICAL THERAPY | Facility: CLINIC | Age: 83
End: 2023-02-07
Payer: MEDICARE

## 2023-02-07 DIAGNOSIS — S62.102D CLOSED FRACTURE OF LEFT WRIST WITH ROUTINE HEALING, SUBSEQUENT ENCOUNTER: ICD-10-CM

## 2023-02-07 DIAGNOSIS — M25.532 PAIN IN LEFT WRIST: Primary | ICD-10-CM

## 2023-02-07 PROCEDURE — 97112 NEUROMUSCULAR REEDUCATION: CPT | Performed by: PHYSICAL THERAPIST

## 2023-02-07 PROCEDURE — 97110 THERAPEUTIC EXERCISES: CPT | Performed by: PHYSICAL THERAPIST

## 2023-02-07 PROCEDURE — 97140 MANUAL THERAPY 1/> REGIONS: CPT | Performed by: PHYSICAL THERAPIST

## 2023-02-07 NOTE — PROGRESS NOTES
Physical Therapy Daily Treatment Note    [facility] [facility], [address]  [place]  [address] [phone]      Patient: Christopher Talbert   : 1940  Diagnosis/ICD-10 Code:  Pain in left wrist [M25.532]  Referring practitioner: No ref. provider found Dr. Ruiz  Date of Initial Visit: 2023  Today's Date: 2023  Patient seen for 13 sessions.  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      VISIT#: 13      Subjective :  Pt. Notes that lateral wrist along ulnar border has been bothering him.  Pain 1/10 currently. Pt. Not feeling well. Thinks he is getting a cold.     Objective     See Exercise, Manual, and Modality Logs for complete treatment.    Swelling noted posterior wrist, ulnar side.  No tenderness to touch.     Patient Education:      Assessment/Plan :  Patient had a more difficult time with activity today possibly due to not feeling well.      Progress per Plan of Care  :             Timed:         Manual Therapy:  10       mins  50257;     Therapeutic Exercise:   20      mins  23018;     Neuromuscular Indra: 15      mins  61078;    Therapeutic Activity:          mins  51135;     Gait Training:           mins  55748;     Ultrasound:          mins  51759;    Ionto                                   mins   48356  Self Care                            mins   91136  Aquatic                               mins 37292    Un-Timed:  Electrical Stimulation:         mins  09247 ( );  Traction          mins 15861      Timed Treatment:  45    mins   Total Treatment:    45    mins    Alyson Neal PTA  Physical Therapist Assistant   [place] license:  #[id number]

## 2023-02-10 ENCOUNTER — TREATMENT (OUTPATIENT)
Dept: PHYSICAL THERAPY | Facility: CLINIC | Age: 83
End: 2023-02-10
Payer: MEDICARE

## 2023-02-10 ENCOUNTER — OFFICE VISIT (OUTPATIENT)
Dept: FAMILY MEDICINE CLINIC | Facility: CLINIC | Age: 83
End: 2023-02-10
Payer: MEDICARE

## 2023-02-10 VITALS
BODY MASS INDEX: 27.86 KG/M2 | WEIGHT: 199 LBS | SYSTOLIC BLOOD PRESSURE: 130 MMHG | HEIGHT: 71 IN | HEART RATE: 56 BPM | RESPIRATION RATE: 18 BRPM | DIASTOLIC BLOOD PRESSURE: 62 MMHG | OXYGEN SATURATION: 98 %

## 2023-02-10 DIAGNOSIS — Z82.49 FAMILY HISTORY OF PREMATURE CAD: ICD-10-CM

## 2023-02-10 DIAGNOSIS — I10 ESSENTIAL HYPERTENSION: Primary | ICD-10-CM

## 2023-02-10 DIAGNOSIS — S62.102D CLOSED FRACTURE OF LEFT WRIST WITH ROUTINE HEALING, SUBSEQUENT ENCOUNTER: ICD-10-CM

## 2023-02-10 DIAGNOSIS — M25.532 PAIN IN LEFT WRIST: Primary | ICD-10-CM

## 2023-02-10 DIAGNOSIS — K21.9 GERD WITHOUT ESOPHAGITIS: ICD-10-CM

## 2023-02-10 DIAGNOSIS — E78.2 MIXED HYPERLIPIDEMIA: ICD-10-CM

## 2023-02-10 DIAGNOSIS — I45.10 RIGHT BUNDLE BRANCH BLOCK: ICD-10-CM

## 2023-02-10 DIAGNOSIS — R00.1 BRADYCARDIA WITH 41-50 BEATS PER MINUTE: ICD-10-CM

## 2023-02-10 DIAGNOSIS — J44.9 CHRONIC OBSTRUCTIVE PULMONARY DISEASE, UNSPECIFIED COPD TYPE: ICD-10-CM

## 2023-02-10 PROBLEM — G25.2 INTENTION TREMOR: Status: ACTIVE | Noted: 2023-02-10

## 2023-02-10 PROBLEM — G25.3 MYOCLONIC DISORDER: Status: ACTIVE | Noted: 2023-02-10

## 2023-02-10 PROBLEM — H57.10 PAIN IN EYE: Status: ACTIVE | Noted: 2023-02-10

## 2023-02-10 PROBLEM — S42.309S LATE EFFECT OF FRACTURE OF UPPER EXTREMITIES: Status: ACTIVE | Noted: 2023-02-10

## 2023-02-10 PROBLEM — S52.502A CLOSED FRACTURE OF DISTAL END OF LEFT RADIUS: Status: ACTIVE | Noted: 2023-02-10

## 2023-02-10 PROBLEM — M25.569 PAIN IN KNEE JOINT: Status: ACTIVE | Noted: 2023-02-10

## 2023-02-10 PROBLEM — H25.819 COMBINED FORM OF SENILE CATARACT: Status: ACTIVE | Noted: 2023-02-10

## 2023-02-10 PROCEDURE — 99214 OFFICE O/P EST MOD 30 MIN: CPT | Performed by: STUDENT IN AN ORGANIZED HEALTH CARE EDUCATION/TRAINING PROGRAM

## 2023-02-10 PROCEDURE — 97140 MANUAL THERAPY 1/> REGIONS: CPT | Performed by: PHYSICAL THERAPIST

## 2023-02-10 PROCEDURE — 97112 NEUROMUSCULAR REEDUCATION: CPT | Performed by: PHYSICAL THERAPIST

## 2023-02-10 PROCEDURE — 97110 THERAPEUTIC EXERCISES: CPT | Performed by: PHYSICAL THERAPIST

## 2023-02-10 RX ORDER — OMEPRAZOLE 20 MG/1
TABLET, DELAYED RELEASE ORAL
COMMUNITY
End: 2023-02-10 | Stop reason: SDUPTHER

## 2023-02-10 RX ORDER — HYDROCHLOROTHIAZIDE 12.5 MG/1
25 TABLET ORAL DAILY
Qty: 90 TABLET | Refills: 3 | Status: SHIPPED | OUTPATIENT
Start: 2023-02-10

## 2023-02-10 RX ORDER — ALBUTEROL SULFATE 90 UG/1
1 AEROSOL, METERED RESPIRATORY (INHALATION) EVERY 4 HOURS PRN
Qty: 18 G | Refills: 3 | Status: SHIPPED | OUTPATIENT
Start: 2023-02-10

## 2023-02-10 RX ORDER — PROPRANOLOL HYDROCHLORIDE 10 MG/1
TABLET ORAL
COMMUNITY
Start: 2022-11-30 | End: 2023-02-10

## 2023-02-10 RX ORDER — BENZONATATE 100 MG/1
100 CAPSULE ORAL 3 TIMES DAILY PRN
Qty: 90 CAPSULE | Refills: 3 | Status: SHIPPED | OUTPATIENT
Start: 2023-02-10

## 2023-02-10 RX ORDER — OMEPRAZOLE 20 MG/1
20 TABLET, DELAYED RELEASE ORAL DAILY
Qty: 90 TABLET | Refills: 3 | Status: SHIPPED | OUTPATIENT
Start: 2023-02-10

## 2023-02-10 RX ORDER — FLUTICASONE PROPIONATE AND SALMETEROL 250; 50 UG/1; UG/1
POWDER RESPIRATORY (INHALATION)
COMMUNITY
End: 2023-02-10

## 2023-02-10 RX ORDER — IPRATROPIUM BROMIDE AND ALBUTEROL SULFATE 2.5; .5 MG/3ML; MG/3ML
3 SOLUTION RESPIRATORY (INHALATION) EVERY 4 HOURS PRN
Qty: 360 ML | Refills: 3 | Status: SHIPPED | OUTPATIENT
Start: 2023-02-10

## 2023-02-10 NOTE — PROGRESS NOTES
"Chief Complaint  Chief Complaint   Patient presents with   • Establish Care   • Med Refill     Subjective        Christopher Talbert is a 82 y.o. male who presents to [facility].    History of Present Illness  He is here to establish care.      No acute concerns today.    Needs refill of his HCTZ for his HTN.  On atorvastatin 20 mg daily for HLD.  Needs refill of his trelegy, albuterol and duo neb for COPD.  Is supposed to be wearing cpap but does not tolerate.  Needs refill of omeprazole for GERD.      Needs pulmonologist and cardiologist.    Pulm for COPD.  History of RU lobectomy d/t mesothelioma.  Cardiologist for bradycardia, RBBB, family history of CAD (both mother and father).    Hospitalized last October for potential bradycardia that led to dizziness, a fall, and L arm fracture.  From hospital notes it appears they held his propranolol but he is back on it.  He was not told to discontinue.  His HR at home drops into the 30s.      Moved here from [facility] to help daughter.      Objective   /62   Pulse 56   Resp 18   Ht 180.3 cm (71\")   Wt 90.3 kg (199 lb)   SpO2 98%   BMI 27.75 kg/m²     Estimated body mass index is 27.75 kg/m² as calculated from the following:    Height as of this encounter: 180.3 cm (71\").    Weight as of this encounter: 90.3 kg (199 lb).     Physical Exam   GEN: In no acute distress, non toxic appearing  HEENT: Pupils equal and reactive to light, sclera clear.  No cervical or submandibular lymphadenopathy.  TMs WNL.    CV: Regular rate and rhythm, no murmurs, 2+ peripheral pulses  RESP: Lungs with end expiratory wheezes in all lung fields.  No IWOB.  No cough.    NEURO: AAO to person, place, and time. CN 2-12 intact grossly.   PSYCH: Affect normal, insight fair     PHQ-2 Depression Screening  Little interest or pleasure in doing things? 0-->not at all   Feeling down, depressed, or hopeless? 0-->not at all   PHQ-2 Total Score 0      Result Review " :              Assessment and Plan     Diagnoses and all orders for this visit:    1. Essential hypertension (Primary)  BP at goal.  Continue HCTZ 25 mg daily.    -     hydroCHLOROthiazide (HYDRODIURIL) 12.5 MG tablet; Take 2 tablets by mouth Daily.  Dispense: 90 tablet; Refill: 3    2. Mixed hyperlipidemia  Continue atorvastatin 20 mg daily.  Recheck lipids in 6 months w/ wellness.    3. Chronic obstructive pulmonary disease, unspecified COPD type (HCC)  Continue trelegy w/ prn albuterol and prn duoneb.  Still with slight expiratory wheezes on exam today though no IWOB.  Refer to pulmonology.    -     benzonatate (TESSALON) 100 MG capsule; Take 1 capsule by mouth 3 (Three) Times a Day As Needed for Cough.  Dispense: 90 capsule; Refill: 3  -     Fluticasone-Umeclidin-Vilant (Trelegy Ellipta) 100-62.5-25 MCG/ACT inhaler; Inhale 1 puff Daily.  Dispense: 60 each; Refill: 3  -     albuterol sulfate  (90 Base) MCG/ACT inhaler; Inhale 1 puff Every 4 (Four) Hours As Needed for Wheezing.  Dispense: 18 g; Refill: 3  -     ipratropium-albuterol (DUO-NEB) 0.5-2.5 mg/3 ml nebulizer; Take 3 mL by nebulization Every 4 (Four) Hours As Needed for Wheezing or Shortness of Air.  Dispense: 360 mL; Refill: 3    4. GERD without esophagitis  Refill omeprazole 20 mg daily.    -     omeprazole OTC (PrilOSEC OTC) 20 MG EC tablet; Take 1 tablet by mouth Daily.  Dispense: 90 tablet; Refill: 3    5. Bradycardia with 41-50 beats per minute  Stop inderal 10 mg daily.  No indication that I can see and his HR is down into the 30s at home.  Continue to follow closely.  Will refer to cardiology.      6. Family history of premature CAD    7. Right bundle branch block      Follow Up     Return in about 6 months (around 8/10/2023) for Medicare Wellness.

## 2023-02-10 NOTE — PROGRESS NOTES
Physical Therapy Daily Treatment Note    [facility] [facility], [address]  [place]  [address] [phone]      Patient: Christopher Talbert   : 1940  Diagnosis/ICD-10 Code:  Pain in left wrist [M25.532]  Referring practitioner: No ref. provider found Dr. Ruiz  Date of Initial Visit: 2/10/2023  Today's Date: 2/10/2023  Patient seen for 14 sessions.  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      VISIT#: 14      Subjective :  Pain 1/10 ulnar side of  Left wrist.  Pt. Reports that he can now tie shoes.     Objective     See Exercise, Manual, and Modality Logs for complete treatment. Progression as noted.     Swelling noted posterior wrist, ulnar side.  No tenderness to touch.     Patient Education:      Assessment/Plan :  Progression without issue.     Progress strengthening /stabilization /functional activity  :             Timed:         Manual Therapy:  10       mins  82204;     Therapeutic Exercise:   20      mins  02713;     Neuromuscular Indra: 15      mins  32017;    Therapeutic Activity:          mins  06425;     Gait Training:           mins  71666;     Ultrasound:          mins  44250;    Ionto                                   mins   98372  Self Care                            mins   24667  Aquatic                               mins 01542    Un-Timed:  Electrical Stimulation:         mins  29611 ( );  Traction          mins 90355      Timed Treatment:  45    mins   Total Treatment:    45    mins    Alyson Neal PTA  Physical Therapist Assistant   [place] license:  #[id number]

## 2023-02-14 ENCOUNTER — TREATMENT (OUTPATIENT)
Dept: PHYSICAL THERAPY | Facility: CLINIC | Age: 83
End: 2023-02-14
Payer: MEDICARE

## 2023-02-14 DIAGNOSIS — M25.532 PAIN IN LEFT WRIST: Primary | ICD-10-CM

## 2023-02-14 DIAGNOSIS — S62.102D CLOSED FRACTURE OF LEFT WRIST WITH ROUTINE HEALING, SUBSEQUENT ENCOUNTER: ICD-10-CM

## 2023-02-14 PROCEDURE — 97110 THERAPEUTIC EXERCISES: CPT | Performed by: PHYSICAL THERAPIST

## 2023-02-14 PROCEDURE — 97140 MANUAL THERAPY 1/> REGIONS: CPT | Performed by: PHYSICAL THERAPIST

## 2023-02-14 NOTE — PROGRESS NOTES
Physical Therapy Daily Treatment Note    [facility] [facility], [address]  [place]  [address] [phone]      Patient: Christopher Talbert   : 1940  Diagnosis/ICD-10 Code:  Pain in left wrist [M25.532]  Referring practitioner: No ref. provider found Dr. Ruiz  Date of Initial Visit: 2023  Today's Date: 2023  Patient seen for 15 sessions.  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      VISIT#: 15      Subjective :  Patient reports that there perimeter of his wrist is sore, 2/10.         Objective PATIENT LATE    See Exercise, Manual, and Modality Logs for complete treatment.       Patient Education:      Assessment/Plan :  Pt. Responding well to interventions with improved ROM and strength.       Progress strengthening /stabilization /functional activity  :             Timed:         Manual Therapy:  10       mins  70149;     Therapeutic Exercise:   20      mins  97023;     Neuromuscular Indra:       mins  44735;    Therapeutic Activity:          mins  35067;     Gait Training:           mins  09128;     Ultrasound:          mins  68026;    Ionto                                   mins   91073  Self Care                            mins   18698  Aquatic                               mins 71115    Un-Timed:  Electrical Stimulation:         mins  62887 ( );  Traction          mins 07678      Timed Treatment:  30    mins   Total Treatment:    30    mins    Alyson Neal PTA  Physical Therapist Assistant   [place] license:  #[id number]

## 2023-02-16 ENCOUNTER — TREATMENT (OUTPATIENT)
Dept: PHYSICAL THERAPY | Facility: CLINIC | Age: 83
End: 2023-02-16
Payer: MEDICARE

## 2023-02-16 DIAGNOSIS — M25.532 PAIN IN LEFT WRIST: Primary | ICD-10-CM

## 2023-02-16 DIAGNOSIS — S62.102D CLOSED FRACTURE OF LEFT WRIST WITH ROUTINE HEALING, SUBSEQUENT ENCOUNTER: ICD-10-CM

## 2023-02-16 PROCEDURE — 97112 NEUROMUSCULAR REEDUCATION: CPT | Performed by: PHYSICAL THERAPIST

## 2023-02-16 PROCEDURE — 97140 MANUAL THERAPY 1/> REGIONS: CPT | Performed by: PHYSICAL THERAPIST

## 2023-02-16 PROCEDURE — 97110 THERAPEUTIC EXERCISES: CPT | Performed by: PHYSICAL THERAPIST

## 2023-02-16 NOTE — PROGRESS NOTES
Physical Therapy Daily Treatment Note    [address] [facility], [address]  [place] [address]  [phone]    Patient: Christopher Talbert  : 1940  Referring practitioner: Sara Milton MD  Diagnosis/ ICD10 code: Pain in left wrist [M25.532]  Today's Date: 2023    VISIT#: 16    Subjective   Pt reports: feels he is doing better, his  strength improving. It still hurts but not as bad. His wrist is just a little sore this morning.       Objective     See Exercise, Manual, and Modality Logs for complete treatment.     Patient Education:    Assessment & Plan     Assessment    Assessment details: Pt tolerated today's rx session well. Subjective improvement is reported. Improved functional use of L hand noted today.           Progress per Plan of Care            Timed:         Manual Therapy:    8     mins  61246;     Therapeutic Exercise:    20     mins  02908;     Neuromuscular Indra:   10     mins  50016;    Therapeutic Activity:          mins  78458;     Gait Training:           mins  00480;     Ultrasound:          mins  38613;    Ionto                                   mins   91192  Self Care                            mins   58768  Canal repositioning           mins    61507        Timed Treatment: 38     mins   Total Treatment:     38   mins    Travon Flynn PT, CLT  Physical Therapist  [place] License:  # [id number]

## 2023-02-17 DIAGNOSIS — I10 ESSENTIAL HYPERTENSION: Primary | ICD-10-CM

## 2023-02-21 ENCOUNTER — TREATMENT (OUTPATIENT)
Dept: PHYSICAL THERAPY | Facility: CLINIC | Age: 83
End: 2023-02-21
Payer: MEDICARE

## 2023-02-21 DIAGNOSIS — S62.102D CLOSED FRACTURE OF LEFT WRIST WITH ROUTINE HEALING, SUBSEQUENT ENCOUNTER: ICD-10-CM

## 2023-02-21 DIAGNOSIS — M25.532 PAIN IN LEFT WRIST: Primary | ICD-10-CM

## 2023-02-21 PROCEDURE — 97140 MANUAL THERAPY 1/> REGIONS: CPT | Performed by: PHYSICAL THERAPIST

## 2023-02-21 PROCEDURE — 97110 THERAPEUTIC EXERCISES: CPT | Performed by: PHYSICAL THERAPIST

## 2023-02-21 NOTE — PROGRESS NOTES
Physical Therapy Daily Treatment Note    [facility] [facility], [address]  [place]  [address] [phone]      Patient: Christopher Talbert   : 1940  Diagnosis/ICD-10 Code:  Pain in left wrist [M25.532]  Referring practitioner: No ref. provider found Dr. Ruiz  Date of Initial Visit: 2023  Today's Date: 2023  Patient seen for 17 sessions.  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      VISIT#: 17      Subjective :  Pain 1/10, ulnar side of wrist.         Objective     See Exercise, Manual, and Modality Logs for complete treatment.   Progression as noted.       Patient Education:      Assessment/Plan :  Pt. With better mobility in fingers with improved ease of function.       Progress strengthening /stabilization /functional activity  :             Timed:         Manual Therapy:  10       mins  25690;     Therapeutic Exercise:   20      mins  19509;     Neuromuscular Indra:       mins  56939;    Therapeutic Activity:          mins  10005;     Gait Training:           mins  27702;     Ultrasound:          mins  19432;    Ionto                                   mins   74709  Self Care                            mins   05150  Aquatic                               mins 41061    Un-Timed:  Electrical Stimulation:         mins  62883 ( );  Traction          mins 44840      Timed Treatment:  30    mins   Total Treatment:    30    mins    Alyson Neal PTA  Physical Therapist Assistant   [place] license:  #[id number]

## 2023-02-22 ENCOUNTER — OFFICE VISIT (OUTPATIENT)
Dept: CARDIOLOGY | Facility: CLINIC | Age: 83
End: 2023-02-22
Payer: MEDICARE

## 2023-02-22 VITALS
SYSTOLIC BLOOD PRESSURE: 147 MMHG | DIASTOLIC BLOOD PRESSURE: 68 MMHG | HEIGHT: 71 IN | HEART RATE: 46 BPM | OXYGEN SATURATION: 96 % | BODY MASS INDEX: 27.3 KG/M2 | WEIGHT: 195 LBS

## 2023-02-22 DIAGNOSIS — I10 ESSENTIAL HYPERTENSION: ICD-10-CM

## 2023-02-22 DIAGNOSIS — J43.1 PANLOBULAR EMPHYSEMA: ICD-10-CM

## 2023-02-22 DIAGNOSIS — R42 DIZZINESS: ICD-10-CM

## 2023-02-22 DIAGNOSIS — R06.09 DYSPNEA ON EXERTION: Primary | ICD-10-CM

## 2023-02-22 DIAGNOSIS — R05.3 CHRONIC COUGH: ICD-10-CM

## 2023-02-22 DIAGNOSIS — I45.10 RIGHT BUNDLE BRANCH BLOCK: ICD-10-CM

## 2023-02-22 DIAGNOSIS — R00.1 BRADYCARDIA: ICD-10-CM

## 2023-02-22 PROCEDURE — 99214 OFFICE O/P EST MOD 30 MIN: CPT | Performed by: INTERNAL MEDICINE

## 2023-02-22 RX ORDER — LANOLIN ALCOHOL/MO/W.PET/CERES
1000 CREAM (GRAM) TOPICAL DAILY
COMMUNITY

## 2023-02-22 NOTE — PROGRESS NOTES
Subjective:     Encounter Date:02/22/2023      Patient ID: Christopher Talbert is a 82 y.o. male.    Chief Complaint : Follow-up for bradycardia, right bundle, hypertension    History of Present Illness        Mr. Christopher Talbert has PMH of    Bradycardia  Right bundle branch block  Hypertension  Fall and left radial fracture 7/2022  COPD  Mesothelioma  Partial lung surgery  Spine surgery, appendectomy  Allergies/intolerance to penicillin  Family history negative for premature CAD  Former smoker    Here for   follow-up.  Patient denies any chest pain.  Patient is complaining of cough and shortness of breath and dyspnea on exertion..  Patient recently was in the hospital 10/13/2022 with dizziness, fall and radial fracture, was found to be bradycardic with heart rate of 30-40s bpm.  Interest was held.  Patient continues to be bradycardic and has intermittent dizziness.    Patient's arterial blood pressure is 147/68, heart rate 46 bpm, O2 sat of 96% on room air    Echocardiogram 7/25/2022: Normal LVEF of 56-60 with mildly dilated RV    Labs from 10/14/2022 revealed normal TSH at 3.03.  Labs from 10/15/2022 revealed normal BMP and CBC with a hemoglobin of 12.4. 10/14/2022: TSH was 3.03     Assessment:     Dyspnea on exertion  Cough  Bradycardia  Right bundle branch block  Hypertension  JEFF  COPD        Plan:    Patient presented with dizziness fall and fracture was bradycardic Inderal was held and heart rate is still low.  We will check a Holter monitor to evaluate bradycardia and dizziness.  We will schedule a stress Cardiolite to evaluate heart rate response on treadmill also to evaluate dyspnea as anginal equivalent.  Patient is complaining of shortness of breath has previous lung surgery and is having cough.  We will send to pulmonary.  Patient states his PMD referred and he has not heard yet.  Patient underwent echocardiogram which revealed normal LV systolic function  TSH 10/14/2022 was normal .  Advised patient to  check blood pressure at home.  Patient reportedly had stress test when he was in [place]  We will follow-up closely  and monitor blood pressure and rhythm         Procedures   EKG done 10/13/2022 reviewed/interpreted by me reveals sinus bradycardia at the rate of 44 bpm with left atrial enlargement and right bundle branch block    Copied text in this portion of the note has been reviewed and is accurate as of 2/22/2023  The following portions of the patient's history were reviewed and updated as appropriate: allergies, current medications, past family history, past medical history, past social history, past surgical history and problem list.    Assessment:         [facility]     Diagnosis Plan   1. Dyspnea on exertion  Stress Test With Myocardial Perfusion One Day    Holter Monitor - 72 Hour Up To 15 Days    Ambulatory Referral to Pulmonology      2. Bradycardia  Stress Test With Myocardial Perfusion One Day    Holter Monitor - 72 Hour Up To 15 Days    Ambulatory Referral to Pulmonology      3. Right bundle branch block  Stress Test With Myocardial Perfusion One Day    Holter Monitor - 72 Hour Up To 15 Days    Ambulatory Referral to Pulmonology      4. Chronic cough  Stress Test With Myocardial Perfusion One Day    Holter Monitor - 72 Hour Up To 15 Days    Ambulatory Referral to Pulmonology      5. Essential hypertension  Stress Test With Myocardial Perfusion One Day    Holter Monitor - 72 Hour Up To 15 Days    Ambulatory Referral to Pulmonology      6. Panlobular emphysema (HCC)  Stress Test With Myocardial Perfusion One Day    Holter Monitor - 72 Hour Up To 15 Days    Ambulatory Referral to Pulmonology      7. Dizziness  Stress Test With Myocardial Perfusion One Day    Holter Monitor - 72 Hour Up To 15 Days    Ambulatory Referral to Pulmonology             Plan:               Past Medical History:  Past Medical History:   Diagnosis Date   • COPD (chronic obstructive pulmonary disease) (HCC)    • Hypertension    •  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HCC)      Past Surgical History:  Past Surgical History:   Procedure Laterality Date   • APPENDECTOMY     • LUNG REMOVAL, PARTIAL     • SPINE SURGERY        Allergies:  Allergies   Allergen Reactions   • Penicillins Unknown - High Severity     Home Meds:  Current Meds:     Current Outpatient Medications:   •  albuterol sulfate  (90 Base) MCG/ACT inhaler, Inhale 1 puff Every 4 (Four) Hours As Needed for Wheezing., Disp: 18 g, Rfl: 3  •  atorvastatin (LIPITOR) 20 MG tablet, Take 20 mg by mouth Daily., Disp: , Rfl:   •  benzonatate (TESSALON) 100 MG capsule, Take 1 capsule by mouth 3 (Three) Times a Day As Needed for Cough., Disp: 90 capsule, Rfl: 3  •  cholecalciferol (VITAMIN D3) 25 MCG (1000 UT) tablet, Take 1,000 Units by mouth Daily., Disp: , Rfl:   •  Fluticasone-Umeclidin-Vilant (Trelegy Ellipta) 100-62.5-25 MCG/ACT inhaler, Inhale 1 puff Daily., Disp: 60 each, Rfl: 3  •  hydroCHLOROthiazide (HYDRODIURIL) 12.5 MG tablet, Take 2 tablets by mouth Daily., Disp: 90 tablet, Rfl: 3  •  ipratropium-albuterol (DUO-NEB) 0.5-2.5 mg/3 ml nebulizer, Take 3 mL by nebulization Every 4 (Four) Hours As Needed for Wheezing or Shortness of Air., Disp: 360 mL, Rfl: 3  •  omeprazole OTC (PrilOSEC OTC) 20 MG EC tablet, Take 1 tablet by mouth Daily., Disp: 90 tablet, Rfl: 3  •  Potassium 99 MG tablet, , Disp: , Rfl:   •  vitamin B-12 (CYANOCOBALAMIN) 1000 MCG tablet, Take 1,000 mcg by mouth Daily., Disp: , Rfl:   Social History:   Social History     Tobacco Use   • Smoking status: Former     Passive exposure: Past   • Smokeless tobacco: Never   Substance Use Topics   • Alcohol use: Yes     Comment: 1 every few weeks      Family History:  Family History   Problem Relation Age of Onset   • Heart disease Mother    • Heart disease Father               Review of Systems   Constitutional: Positive for malaise/fatigue.   Cardiovascular: Negative for chest pain, leg swelling and palpitations.   Respiratory: Positive for  "shortness of breath.    Skin: Negative for rash.   Neurological: Positive for dizziness and light-headedness. Negative for numbness.     All other systems are negative         Objective:     Physical Exam  /68 Comment: provider notified  Pulse (!) 46   Ht 180.3 cm (71\")   Wt 88.5 kg (195 lb)   SpO2 96%   BMI 27.20 kg/m²   General:  Appears in no acute distress  Eyes: Sclera is anicteric,  conjunctiva is clear   HEENT:  No JVD.  No carotid bruits  Respiratory: Respirations regular and unlabored at rest.  Clear to auscultation  Cardiovascular: S1,S2 Regular rate and rhythm. No murmur, rub or gallop auscultated.   Extremities: No digital clubbing or cyanosis, no edema  Skin: Color pink. Skin warm and dry to touch. No rashes  No xanthoma  Neuro: Alert and awake.    Lab Reviewed:         Zia Whipple MD  2/22/2023 11:12 EST      EMR Dragon/Transcription:   \"Dictated utilizing Dragon dictation\".        "

## 2023-02-24 ENCOUNTER — TREATMENT (OUTPATIENT)
Dept: PHYSICAL THERAPY | Facility: CLINIC | Age: 83
End: 2023-02-24
Payer: MEDICARE

## 2023-02-24 DIAGNOSIS — S62.102D CLOSED FRACTURE OF LEFT WRIST WITH ROUTINE HEALING, SUBSEQUENT ENCOUNTER: ICD-10-CM

## 2023-02-24 DIAGNOSIS — M25.532 PAIN IN LEFT WRIST: Primary | ICD-10-CM

## 2023-02-24 PROCEDURE — 97110 THERAPEUTIC EXERCISES: CPT | Performed by: PHYSICAL THERAPIST

## 2023-02-24 NOTE — PROGRESS NOTES
Physical Therapy Daily Treatment Note    [address] [facility], [address]  [place] [address]  [phone]    Patient: Christopher Talbert  : 1940  Referring practitioner: Sara Milton MD  Diagnosis/ ICD10 code: Pain in left wrist [M25.532]  Today's Date: 2023    VISIT#: 18    Subjective   Pt reports: doing better, feels he has more mobility and strength in L hand.       Objective     See Exercise, Manual, and Modality Logs for complete treatment.     Patient Education:    Assessment & Plan     Assessment    Assessment details: Pt tolerated today's rx session well. He is responding well to therapy and making steady progress.           Progress per Plan of Care            Timed:         Manual Therapy:   8      mins  63285;     Therapeutic Exercise:    25     mins  16694;     Neuromuscular Indra:        mins  85565;    Therapeutic Activity:          mins  79436;     Gait Training:           mins  66116;     Ultrasound:          mins  97158;    Ionto                                   mins   66935  Self Care                            mins   07229  Canal repositioning           mins    30616        Timed Treatment:  33    mins   Total Treatment:    33    mins    Travon Flynn PT, CLT  Physical Therapist  [place] License:  # [id number]

## 2023-02-28 ENCOUNTER — TREATMENT (OUTPATIENT)
Dept: PHYSICAL THERAPY | Facility: CLINIC | Age: 83
End: 2023-02-28
Payer: MEDICARE

## 2023-02-28 DIAGNOSIS — S62.102D CLOSED FRACTURE OF LEFT WRIST WITH ROUTINE HEALING, SUBSEQUENT ENCOUNTER: ICD-10-CM

## 2023-02-28 DIAGNOSIS — M25.532 PAIN IN LEFT WRIST: Primary | ICD-10-CM

## 2023-02-28 PROCEDURE — 97140 MANUAL THERAPY 1/> REGIONS: CPT | Performed by: PHYSICAL THERAPIST

## 2023-02-28 PROCEDURE — 97110 THERAPEUTIC EXERCISES: CPT | Performed by: PHYSICAL THERAPIST

## 2023-02-28 NOTE — PROGRESS NOTES
Physical Therapy Daily Treatment Note    [facility], [address]  [place]  [address] [phone]      Patient: Christohper Talbert   : 1940  Diagnosis/ICD-10 Code:  Pain in left wrist [M25.532]  Referring practitioner: No ref. provider found Dr. Ruiz  Date of Initial Visit: 2023  Today's Date: 2023  Patient seen for 19 sessions.  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      VISIT#: 19      Subjective :  Pain minimal today.         Objective     See Exercise, Manual, and Modality Logs for complete treatment.   Progression as noted.       Patient Education:      Assessment/Plan :  Pt. With noted fatigue during there ex.       Progress strengthening /stabilization /functional activity  :             Timed:         Manual Therapy:  10       mins  05035;     Therapeutic Exercise:   20      mins  53008;     Neuromuscular Indra:       mins  31211;    Therapeutic Activity:          mins  51989;     Gait Training:           mins  97247;     Ultrasound:          mins  89034;    Ionto                                   mins   80077  Self Care                            mins   66127  Aquatic                               mins 57070    Un-Timed:  Electrical Stimulation:         mins  17356 ( );  Traction          mins 14379      Timed Treatment:  30    mins   Total Treatment:    30    mins    Alyson Neal PTA  Physical Therapist Assistant   [place] license:  #[id number]

## 2023-03-06 ENCOUNTER — OFFICE VISIT (OUTPATIENT)
Dept: PULMONOLOGY | Facility: HOSPITAL | Age: 83
End: 2023-03-06
Payer: MEDICARE

## 2023-03-06 VITALS
HEIGHT: 71 IN | HEART RATE: 44 BPM | BODY MASS INDEX: 26.88 KG/M2 | SYSTOLIC BLOOD PRESSURE: 146 MMHG | OXYGEN SATURATION: 98 % | DIASTOLIC BLOOD PRESSURE: 70 MMHG | RESPIRATION RATE: 14 BRPM | WEIGHT: 192 LBS

## 2023-03-06 DIAGNOSIS — G47.33 OSA (OBSTRUCTIVE SLEEP APNEA): ICD-10-CM

## 2023-03-06 DIAGNOSIS — C45.9 MESOTHELIOMA: ICD-10-CM

## 2023-03-06 DIAGNOSIS — J44.9 CHRONIC OBSTRUCTIVE PULMONARY DISEASE, UNSPECIFIED COPD TYPE: Primary | ICD-10-CM

## 2023-03-06 PROCEDURE — G0463 HOSPITAL OUTPT CLINIC VISIT: HCPCS

## 2023-03-06 NOTE — PROGRESS NOTES
PULMONARY/ CRITICAL CARE/ SLEEP MEDICINE OUTPATIENT CONSULT/ FOLLOW UP NOTE        Patient Name:  Christopher Talbert    :  1940    Medical Record:  [id number]    PRIMARY CARE PHYSICIAN     Eliot Millard DO    REASON FOR CONSULTATION    Christopher Talbert is a 82 y.o. male who is referred for consultation for COPD, JEFF  REVIEW OF SYSTEMS    Constitutional:  Denies fever or chills   Eyes:  Denies change in visual acuity   HENT:  Denies nasal congestion or sore throat   Respiratory:  Denies cough or shortness of breath   Cardiovascular:  Denies chest pain or edema   GI:  Denies abdominal pain, nausea, vomiting, bloody stools or diarrhea   :  Denies dysuria   Musculoskeletal:  Denies back pain or joint pain   Integument:  Denies rash   Neurologic:  Denies headache, focal weakness or sensory changes   Endocrine:  Denies polyuria or polydipsia   Lymphatic:  Denies swollen glands   Psychiatric:  Denies depression or anxiety     MEDICAL HISTORY    Past Medical History:   Diagnosis Date   • Chronic obstructive pulmonary disease (HCC) 10/14/2022   • COPD (chronic obstructive pulmonary disease) ([facility])    • GERD without esophagitis 10/14/2022   • Hypertension    • Leukocytosis, unspecified type 2022   • 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[facility])    • JEFF on CPAP 10/14/2022   • Pneumonia of right middle lobe due to infectious organism 2022        SURGICAL HISTORY    Past Surgical History:   Procedure Laterality Date   • APPENDECTOMY     • LUNG REMOVAL, PARTIAL      5-6 years ago   • SPINE SURGERY          FAMILY HISTORY    Family History   Problem Relation Age of Onset   • Heart disease Mother    • Heart disease Father        SOCIAL HISTORY    Social History     Tobacco Use   • Smoking status: Former     Passive exposure: Past   • Smokeless tobacco: Never   Substance Use Topics   • Alcohol use: Yes     Comment: 1 every few weeks        ALLERGIES    Allergies   Allergen Reactions   • Penicillins Unknown - High Severity  "        MEDICATIONS    Current Outpatient Medications on File Prior to Visit   Medication Sig Dispense Refill   • albuterol sulfate  (90 Base) MCG/ACT inhaler Inhale 1 puff Every 4 (Four) Hours As Needed for Wheezing. 18 g 3   • atorvastatin (LIPITOR) 20 MG tablet Take 1 tablet by mouth Daily.     • benzonatate (TESSALON) 100 MG capsule Take 1 capsule by mouth 3 (Three) Times a Day As Needed for Cough. 90 capsule 3   • cholecalciferol (VITAMIN D3) 25 MCG (1000 UT) tablet Take 1 tablet by mouth Daily.     • Fluticasone-Umeclidin-Vilant (Trelegy Ellipta) 100-62.5-25 MCG/ACT inhaler Inhale 1 puff Daily. 60 each 3   • hydroCHLOROthiazide (HYDRODIURIL) 12.5 MG tablet Take 2 tablets by mouth Daily. 90 tablet 3   • ipratropium-albuterol (DUO-NEB) 0.5-2.5 mg/3 ml nebulizer Take 3 mL by nebulization Every 4 (Four) Hours As Needed for Wheezing or Shortness of Air. 360 mL 3   • omeprazole OTC (PrilOSEC OTC) 20 MG EC tablet Take 1 tablet by mouth Daily. 90 tablet 3   • Potassium 99 MG tablet      • vitamin B-12 (CYANOCOBALAMIN) 1000 MCG tablet Take 1 tablet by mouth Daily.       No current facility-administered medications on file prior to visit.       PHYSICAL EXAM    Vitals:    03/06/23 1145   BP: 146/70   BP Location: Left arm   Patient Position: Sitting   Cuff Size: Adult   Pulse: (!) 44   Resp: 14   SpO2: 98%   Weight: 87.1 kg (192 lb)   Height: 180.3 cm (71\")        Constitutional:  Well developed, well nourished, no acute distress, non-toxic appearance   Eyes:  PERRL, conjunctiva normal   HENT:  Atraumatic, external ears normal, nose normal, oropharynx moist, no pharyngeal exudates. mallampatti   Neck- normal range of motion, no tenderness, supple   Respiratory:  No respiratory distress, normal breath sounds, no rales, no wheezing   Cardiovascular:  Normal rate, normal rhythm, no murmurs, no gallops, no rubs   GI:  Soft, nondistended, normal bowel sounds, nontender, no organomegaly, no mass, no rebound, no " guarding   :  No costovertebral angle tenderness   Musculoskeletal:  No edema, no tenderness, no deformities. Back- no tenderness  Integument:  Well hydrated, no rash   Lymphatic:  No lymphadenopathy noted   Neurologic:  Alert & oriented x 3, CN 2-12 normal, normal motor function, normal sensory function, no focal deficits noted   Psychiatric:  Speech and behavior appropriate     No radiology results for the last 90 days.   Results for orders placed during the hospital encounter of 07/21/22    Adult Transthoracic Echo Complete W/ Cont if Necessary Per Protocol    Interpretation Summary  · Left ventricular ejection fraction appears to be 56 - 60%.  · The right ventricular cavity is mildly dilated.  · No pericardial effusion noted      ASSESSMENT:     81 y/o x-smoker of 62 years (quit 6-7 years) here for COPD and JEFF.  Recently moved in July from [place].  Was followed by pulmonologist  History of Right upper Lobectomy (6-7 years ago) for Mesothelioma.    20 years in [place]    COPD, will obtain PFTs     Echocardiogram 7/25/2022: Normal LVEF of 56-60 with mildly dilated RV    JEFF, not currently wearing d/t malfunction   Original HST in [facility] 6 years ago    Bradycardia     PLAN:    Chest CT without contrast    Repeat HST-depending on results patient would like a referral to ENT for [facility] consultation  -struggled to tolerate CPAP    Please get records (including PFTs) from Dr Ramon Marie ([phone])    Trelegy daily, and Albuterol PRN, and nebs PRN    Follow up 1 mo       This document has been electronically signed by    MD Adilia Jane, ARELIS  11:49 EST

## 2023-03-10 ENCOUNTER — TREATMENT (OUTPATIENT)
Dept: PHYSICAL THERAPY | Facility: CLINIC | Age: 83
End: 2023-03-10
Payer: MEDICARE

## 2023-03-10 DIAGNOSIS — S62.102D CLOSED FRACTURE OF LEFT WRIST WITH ROUTINE HEALING, SUBSEQUENT ENCOUNTER: ICD-10-CM

## 2023-03-10 DIAGNOSIS — M25.532 PAIN IN LEFT WRIST: Primary | ICD-10-CM

## 2023-03-10 PROCEDURE — 97140 MANUAL THERAPY 1/> REGIONS: CPT | Performed by: PHYSICAL THERAPIST

## 2023-03-10 PROCEDURE — 97110 THERAPEUTIC EXERCISES: CPT | Performed by: PHYSICAL THERAPIST

## 2023-03-10 NOTE — PROGRESS NOTES
Physical Therapy Daily Treatment Note    [address], [address]  [place]  [address] [phone]      Patient: Christopher Talbert   : 1940  Diagnosis/ICD-10 Code:  Pain in left wrist [M25.532]  Referring practitioner: No ref. provider found Dr. Ruiz  Date of Initial Visit: 3/10/2023  Today's Date: 3/10/2023  Patient seen for 20 sessions.  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      VISIT#: 20      Subjective :  Patient notes burning/stinging 1/10 in left hand from thumb to radial side of left wrist.  It occurs on and off and with no particular activity.          Objective     See Exercise, Manual, and Modality Logs for complete treatment.         Patient Education:      Assessment/Plan :  Resisted wrist flexion difficult, especially against gravity.  He is also having difficulty with fine motor movements.       Progress strengthening /stabilization /functional activity  :             Timed:         Manual Therapy:  10       mins  95349;     Therapeutic Exercise:   20      mins  48012;     Neuromuscular Indra:       mins  79236;    Therapeutic Activity:          mins  34102;     Gait Training:           mins  47602;     Ultrasound:          mins  59885;    Ionto                                   mins   31803  Self Care                            mins   74638  Aquatic                               mins 06600    Un-Timed:  Electrical Stimulation:         mins  21464 (MC );  Traction          mins 03153      Timed Treatment:  30    mins   Total Treatment:    30    mins    Alyson Neal PTA  Physical Therapist Assistant   [place] license:  #[id number]

## 2023-03-13 ENCOUNTER — TREATMENT (OUTPATIENT)
Dept: PHYSICAL THERAPY | Facility: CLINIC | Age: 83
End: 2023-03-13
Payer: MEDICARE

## 2023-03-13 DIAGNOSIS — S62.102D CLOSED FRACTURE OF LEFT WRIST WITH ROUTINE HEALING, SUBSEQUENT ENCOUNTER: ICD-10-CM

## 2023-03-13 DIAGNOSIS — M25.532 PAIN IN LEFT WRIST: Primary | ICD-10-CM

## 2023-03-13 PROCEDURE — 97140 MANUAL THERAPY 1/> REGIONS: CPT | Performed by: PHYSICAL THERAPIST

## 2023-03-13 PROCEDURE — 97110 THERAPEUTIC EXERCISES: CPT | Performed by: PHYSICAL THERAPIST

## 2023-03-13 NOTE — PROGRESS NOTES
Physical Therapy Daily Treatment Note    [facility], [address]  [place]  [address] [phone]      Patient: Christopher Talbert   : 1940  Diagnosis/ICD-10 Code:  Pain in left wrist [M25.532]  Referring practitioner: No ref. provider found Dr. Ruiz  Date of Initial Visit: 3/13/2023  Today's Date: 3/13/2023  Patient seen for 21 sessions.  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      VISIT#: 21      Subjective :  No pain currently. No more stinging in wrist.  Pt. Notes that he used his wrist a lot yesterday working on a mirror at home.         Objective     See Exercise, Manual, and Modality Logs for complete treatment.         Patient Education:      Assessment/Plan :  Pt.. wrist/hand fatigued before even starting exercises.  He had a hard time with controlling motion due to this.       Progress strengthening /stabilization /functional activity  :             Timed:         Manual Therapy:  10       mins  85181;     Therapeutic Exercise:   20      mins  53700;     Neuromuscular Indra:       mins  02756;    Therapeutic Activity:          mins  08971;     Gait Training:           mins  09566;     Ultrasound:          mins  31522;    Ionto                                   mins   27575  Self Care                            mins   65337  Aquatic                               mins 71251    Un-Timed:  Electrical Stimulation:         mins  26719 ( );  Traction          mins 78806      Timed Treatment:  30    mins   Total Treatment:    30    mins    Alyson Neal PTA  Physical Therapist Assistant   [place] license:  #[id number]

## 2023-03-15 ENCOUNTER — TREATMENT (OUTPATIENT)
Dept: PHYSICAL THERAPY | Facility: CLINIC | Age: 83
End: 2023-03-15
Payer: MEDICARE

## 2023-03-15 ENCOUNTER — HOSPITAL ENCOUNTER (OUTPATIENT)
Dept: CT IMAGING | Facility: HOSPITAL | Age: 83
Discharge: HOME | End: 2023-03-15
Payer: MEDICARE

## 2023-03-15 DIAGNOSIS — C45.9 MESOTHELIOMA: ICD-10-CM

## 2023-03-15 DIAGNOSIS — J44.9 CHRONIC OBSTRUCTIVE PULMONARY DISEASE, UNSPECIFIED COPD TYPE: ICD-10-CM

## 2023-03-15 DIAGNOSIS — M25.532 PAIN IN LEFT WRIST: Primary | ICD-10-CM

## 2023-03-15 DIAGNOSIS — S62.102D CLOSED FRACTURE OF LEFT WRIST WITH ROUTINE HEALING, SUBSEQUENT ENCOUNTER: ICD-10-CM

## 2023-03-15 PROCEDURE — 71250 CT THORAX DX C-: CPT

## 2023-03-15 PROCEDURE — 97110 THERAPEUTIC EXERCISES: CPT | Performed by: PHYSICAL THERAPIST

## 2023-03-15 PROCEDURE — 97140 MANUAL THERAPY 1/> REGIONS: CPT | Performed by: PHYSICAL THERAPIST

## 2023-03-15 NOTE — PROGRESS NOTES
Physical Therapy Progress Note/ day pn    [address], [address]  [place] [address]  [phone]    Patient: Christopher Talbert  : 1940  Referring practitioner: No ref. provider found  Diagnosis/ ICD10 code: Pain in left wrist [M25.532]  Today's Date: 3/15/2023    VISIT#: 22  s/p closed fracture of left wrist 22    Subjective   Pt reports: doing ok, feels at least 75% improvement since start of therapy. He is using it more with daily activities. Pain has not been more than a 1 or 2/10.       Objective          Active Range of Motion     Left Elbow   Forearm supination: 40 degrees with pain    Left Wrist   Wrist flexion: 50 degrees   Wrist extension: 45 degrees   Radial deviation: 20 degrees   Ulnar deviation: 20 degrees     Strength/Myotome Testing     Left Wrist/Hand   Wrist extension: 5  Wrist flexion: 5  Radial deviation: 4+  Ulnar deviation: 4+     (2nd hand position)   Left  strength (2nd hand position) 20 lbs        See Exercise, Manual, and Modality Logs for complete treatment.     Patient Education:    Assessment & Plan     Assessment    Assessment details: Pt tolerated today's rx session well. He is making slow but steady progress. Improved functional use of L hand. Improved  strength.   All STGs met, 3/6 LTGs met, rest partially met so far.     Goals  Plan Goals:  STGs to be met in 4 weeks:  --  Obtain objective measures for ROM/strength/edema at first follow-up visit after eval (and adjust/add to goals accordingly) pending HR is in stable range/medical clearance.-MET  --  Require </= 3 cues with HEP performance.-MET  --  Pain levels at worst </= 4/10.-MET  --  Pt to reduce frequency of wearing brace to 50% of time per MD instruction without complications.-MET  --  Quick DASH score </= 50% disability.- MET  --  Be able to lift cell phone with LUE without pain and no brace.-MET    LTGs to be met by end of POC:  --  Pt to completely d/c use of brace per MD approval without  complications.-MET  --  Require no cues with HEP performance for d/c planning.  --  Pain levels at worst </= 2/10.- MET  --  Quick DASH score </= 50% disability.- MET  --  Be able to open jar with BUEs without L wrist pain and no brace.  --  Edema measures of LUE = to that of RUE.    Plan  Plan details: Current POC still indicated. Continue therapy to maximize function and resolve remaining pain.                       Timed:         Manual Therapy:   8      mins  40580;     Therapeutic Exercise:    22     mins  94431;     Neuromuscular Indra:        mins  21904;    Therapeutic Activity:          mins  93173;     Gait Training:           mins  79929;     Ultrasound:          mins  88497;    Ionto                                   mins   35783  Self Care                            mins   43480  Canal repositioning           mins    29220        Timed Treatment:   30   mins   Total Treatment:   30     mins    Travon Flynn PT, CLT  Physical Therapist  [place] License:  # [id number]

## 2023-03-16 ENCOUNTER — HOSPITAL ENCOUNTER (OUTPATIENT)
Dept: CARDIOLOGY | Facility: HOSPITAL | Age: 83
Discharge: HOME | End: 2023-03-16
Payer: MEDICARE

## 2023-03-16 ENCOUNTER — OFFICE VISIT (OUTPATIENT)
Dept: CARDIOLOGY | Facility: CLINIC | Age: 83
End: 2023-03-16
Payer: MEDICARE

## 2023-03-16 VITALS
HEART RATE: 50 BPM | HEIGHT: 71 IN | WEIGHT: 192 LBS | SYSTOLIC BLOOD PRESSURE: 134 MMHG | DIASTOLIC BLOOD PRESSURE: 74 MMHG | BODY MASS INDEX: 26.88 KG/M2

## 2023-03-16 DIAGNOSIS — J43.1 PANLOBULAR EMPHYSEMA: ICD-10-CM

## 2023-03-16 DIAGNOSIS — R06.09 DYSPNEA ON EXERTION: ICD-10-CM

## 2023-03-16 DIAGNOSIS — I45.10 RIGHT BUNDLE BRANCH BLOCK: ICD-10-CM

## 2023-03-16 DIAGNOSIS — R06.09 DYSPNEA ON EXERTION: Primary | ICD-10-CM

## 2023-03-16 DIAGNOSIS — R42 DIZZINESS: ICD-10-CM

## 2023-03-16 DIAGNOSIS — R00.1 BRADYCARDIA: ICD-10-CM

## 2023-03-16 DIAGNOSIS — I10 ESSENTIAL HYPERTENSION: ICD-10-CM

## 2023-03-16 DIAGNOSIS — R05.3 CHRONIC COUGH: ICD-10-CM

## 2023-03-16 PROCEDURE — 25010000002 REGADENOSON 0.4 MG/5ML SOLUTION: Performed by: INTERNAL MEDICINE

## 2023-03-16 PROCEDURE — 78452 HT MUSCLE IMAGE SPECT MULT: CPT

## 2023-03-16 PROCEDURE — 93017 CV STRESS TEST TRACING ONLY: CPT

## 2023-03-16 PROCEDURE — 0 TECHNETIUM SESTAMIBI: Performed by: INTERNAL MEDICINE

## 2023-03-16 PROCEDURE — 1159F MED LIST DOCD IN RCRD: CPT | Performed by: INTERNAL MEDICINE

## 2023-03-16 PROCEDURE — 99214 OFFICE O/P EST MOD 30 MIN: CPT | Performed by: INTERNAL MEDICINE

## 2023-03-16 PROCEDURE — 3078F DIAST BP <80 MM HG: CPT | Performed by: INTERNAL MEDICINE

## 2023-03-16 PROCEDURE — A9500 TC99M SESTAMIBI: HCPCS | Performed by: INTERNAL MEDICINE

## 2023-03-16 PROCEDURE — 78452 HT MUSCLE IMAGE SPECT MULT: CPT | Performed by: INTERNAL MEDICINE

## 2023-03-16 PROCEDURE — 1160F RVW MEDS BY RX/DR IN RCRD: CPT | Performed by: INTERNAL MEDICINE

## 2023-03-16 PROCEDURE — 3075F SYST BP GE 130 - 139MM HG: CPT | Performed by: INTERNAL MEDICINE

## 2023-03-16 PROCEDURE — 93018 CV STRESS TEST I&R ONLY: CPT | Performed by: INTERNAL MEDICINE

## 2023-03-16 RX ADMIN — REGADENOSON 0.4 MG: 0.08 INJECTION, SOLUTION INTRAVENOUS at 14:14

## 2023-03-16 RX ADMIN — TECHNETIUM TC 99M SESTAMIBI 1 DOSE: 1 INJECTION INTRAVENOUS at 13:24

## 2023-03-16 RX ADMIN — TECHNETIUM TC 99M SESTAMIBI 1 DOSE: 1 INJECTION INTRAVENOUS at 14:14

## 2023-03-16 NOTE — PROGRESS NOTES
Subjective:     Encounter Date:03/16/2023      Patient ID: Christopher Talbert is a 82 y.o. male.    Chief Complaint and history of present illness:       Follow-up for bradycardia, right bundle, hypertension    History of Present Illness        Mr. Christopher Talbert has PMH of    Bradycardia  Right bundle branch block  Hypertension  Fall and left radial fracture 7/2022  COPD  Mesothelioma  Partial lung surgery  Spine surgery, appendectomy  Allergies/intolerance to penicillin  Family history negative for premature CAD  Former smoker    Here for   follow-up.  Patient denies any chest pain.  Patient is complaining of cough and shortness of breath and dyspnea on exertion..  Patient recently was in the hospital 10/13/2022 with dizziness, fall and radial fracture, was found to be bradycardic with heart rate of 30-40s bpm.  Interest was held.  Patient continues to be bradycardic and has intermittent dizziness.  Patient underwent Lexiscan Cardiolite is here for follow-up.    Patient's arterial blood pressure is 138/71, heart rate 50 bpm    Echocardiogram 7/25/2022: Normal LVEF of 56-60 with mildly dilated RV    Labs from 10/14/2022 revealed normal TSH at 3.03.  Labs from 10/15/2022 revealed normal BMP and CBC with a hemoglobin of 12.4. 10/14/2022: TSH was 3.03     Assessment:     Dyspnea on exertion  Cough  Bradycardia  Right bundle branch block  Hypertension  JEFF  COPD        Plan:    Patient presented with dizziness fall and fracture was bradycardic Inderal was held and heart rate is still low.  We will check a Holter monitor to evaluate bradycardia and dizziness.  Patient underwent Lexiscan Cardiolite which was negative for ischemia EF of 61% on 3/16/2023.  Patient is complaining of shortness of breath has previous lung surgery and is having cough.  We will send to pulmonary.  Patient states his PMD referred and he has not heard yet.  Patient underwent echocardiogram which revealed normal LV systolic function  TSH  10/14/2022 was normal .  Advised patient to check blood pressure at home.  Patient reportedly had stress test when he was in [place]  We will follow-up closely  and monitor blood pressure and rhythm         Procedures   EKG done 10/13/2022 reviewed/interpreted by me reveals sinus bradycardia at the rate of 44 bpm with left atrial enlargement and right bundle branch block        Procedures  Lexiscan Cardiolite 3/16/2023 reviewed/interpreted by me and reveals normal perfusion EF of 61%    Copied text in this portion of the note has been reviewed and is accurate as of 3/19/2023  The following portions of the patient's history were reviewed and updated as appropriate: allergies, current medications, past family history, past medical history, past social history, past surgical history and problem list.    Assessment:         [facility]     Diagnosis Plan   1. Dyspnea on exertion        2. Bradycardia        3. Right bundle branch block        4. Essential hypertension        5. 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[facility])    • JEFF on CPAP 10/14/2022   • Pneumonia of right middle lobe due to infectious organism 7/24/2022     Past Surgical History:  Past Surgical History:   Procedure Laterality Date   • APPENDECTOMY     • LUNG REMOVAL, PARTIAL      5-6 years ago   • SPINE SURGERY        Allergies:  Allergies   Allergen Reactions   • Penicillins Unknown - High Severity     Home Meds:  Current Meds:     Current Outpatient Medications:   •  albuterol sulfate  (90 Base) MCG/ACT inhaler, Inhale 1 puff Every 4 (Four) Hours As Needed for Wheezing., Disp: 18 g, Rfl: 3  •  atorvastatin (LIPITOR) 20 MG tablet,  "Take 1 tablet by mouth Daily., Disp: , Rfl:   •  benzonatate (TESSALON) 100 MG capsule, Take 1 capsule by mouth 3 (Three) Times a Day As Needed for Cough., Disp: 90 capsule, Rfl: 3  •  cholecalciferol (VITAMIN D3) 25 MCG (1000 UT) tablet, Take 1 tablet by mouth Daily., Disp: , Rfl:   •  Fluticasone-Umeclidin-Vilant (Trelegy Ellipta) 100-62.5-25 MCG/ACT inhaler, Inhale 1 puff Daily., Disp: 60 each, Rfl: 3  •  hydroCHLOROthiazide (HYDRODIURIL) 12.5 MG tablet, Take 2 tablets by mouth Daily., Disp: 90 tablet, Rfl: 3  •  ipratropium-albuterol (DUO-NEB) 0.5-2.5 mg/3 ml nebulizer, Take 3 mL by nebulization Every 4 (Four) Hours As Needed for Wheezing or Shortness of Air., Disp: 360 mL, Rfl: 3  •  omeprazole OTC (PrilOSEC OTC) 20 MG EC tablet, Take 1 tablet by mouth Daily., Disp: 90 tablet, Rfl: 3  •  Potassium 99 MG tablet, , Disp: , Rfl:   •  vitamin B-12 (CYANOCOBALAMIN) 1000 MCG tablet, Take 1 tablet by mouth Daily., Disp: , Rfl:   Social History:   Social History     Tobacco Use   • Smoking status: Former     Passive exposure: Past   • Smokeless tobacco: Never   Substance Use Topics   • Alcohol use: Yes     Comment: 1 every few weeks      Family History:  Family History   Problem Relation Age of Onset   • Heart disease Mother    • Heart disease Father               Review of Systems   Cardiovascular: Negative for chest pain, leg swelling and palpitations.   Respiratory: Negative for shortness of breath.    Neurological: Negative for dizziness and numbness.     All other systems are negative         Objective:     Physical Exam  /74   Pulse 50   Ht 180.3 cm (71\")   Wt 87.1 kg (192 lb)   BMI 26.78 kg/m²   General:  Appears in no acute distress  Eyes: Sclera is anicteric,  conjunctiva is clear   HEENT:  No JVD.  No carotid bruits  Respiratory: Respirations regular and unlabored at rest.  Clear to auscultation  Cardiovascular: S1,S2 Regular rate and rhythm. No murmur, rub or gallop auscultated.   Extremities: No " "digital clubbing or cyanosis, no edema  Skin: Color pink. Skin warm and dry to touch. No rashes  No xanthoma  Neuro: Alert and awake.    Lab Reviewed:         Zia Whipple MD  3/19/2023 12:33 EDT      EMR Dragon/Transcription:   \"Dictated utilizing Dragon dictation\".        "

## 2023-03-17 LAB
BH CV REST NUCLEAR ISOTOPE DOSE: 10.2 MCI
BH CV STRESS BP STAGE 1: NORMAL
BH CV STRESS COMMENTS STAGE 1: NORMAL
BH CV STRESS DOSE REGADENOSON STAGE 1: 0.4
BH CV STRESS DURATION MIN STAGE 1: 0
BH CV STRESS DURATION SEC STAGE 1: 10
BH CV STRESS HR STAGE 1: 50
BH CV STRESS NUCLEAR ISOTOPE DOSE: 32 MCI
BH CV STRESS PROTOCOL 1: NORMAL
BH CV STRESS RECOVERY BP: NORMAL MMHG
BH CV STRESS RECOVERY HR: 67 BPM
BH CV STRESS STAGE 1: 1
MAXIMAL PREDICTED HEART RATE: 140 BPM
STRESS BASELINE BP: NORMAL MMHG
STRESS BASELINE HR: 50 BPM
STRESS TARGET HR: 119 BPM

## 2023-03-20 ENCOUNTER — TREATMENT (OUTPATIENT)
Dept: PHYSICAL THERAPY | Facility: CLINIC | Age: 83
End: 2023-03-20
Payer: MEDICARE

## 2023-03-20 DIAGNOSIS — S62.102D CLOSED FRACTURE OF LEFT WRIST WITH ROUTINE HEALING, SUBSEQUENT ENCOUNTER: ICD-10-CM

## 2023-03-20 DIAGNOSIS — M25.532 PAIN IN LEFT WRIST: Primary | ICD-10-CM

## 2023-03-20 PROCEDURE — 97110 THERAPEUTIC EXERCISES: CPT | Performed by: PHYSICAL THERAPIST

## 2023-03-20 PROCEDURE — 97140 MANUAL THERAPY 1/> REGIONS: CPT | Performed by: PHYSICAL THERAPIST

## 2023-03-20 NOTE — PROGRESS NOTES
Physical Therapy Daily Treatment Note    [facility], [address]  [place]  [address] [phone]      Patient: Christopher Talbert   : 1940  Diagnosis/ICD-10 Code:  Pain in left wrist [M25.532]  Referring practitioner: No ref. provider found Dr. Ruiz  Date of Initial Visit: 3/20/2023  Today's Date: 3/20/2023  Patient seen for 23 sessions.  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      VISIT#: 23      Subjective :  No complaints of pain.         Objective     See Exercise, Manual, and Modality Logs for complete treatment.         Patient Education:      Assessment/Plan :  Less fatigue noted today,      Progress strengthening /stabilization /functional activity  :             Timed:         Manual Therapy:  10       mins  43130;     Therapeutic Exercise:   20      mins  77417;     Neuromuscular Indra:       mins  50249;    Therapeutic Activity:          mins  68048;     Gait Training:           mins  28075;     Ultrasound:          mins  04697;    Ionto                                   mins   14211  Self Care                            mins   31904  Aquatic                               mins 88861    Un-Timed:  Electrical Stimulation:         mins  99879 ( );  Traction          mins 85696      Timed Treatment:  30    mins   Total Treatment:    30    mins    Alyson Neal PTA  Physical Therapist Assistant   [place] license:  #[id number]

## 2023-03-21 ENCOUNTER — HOSPITAL ENCOUNTER (OUTPATIENT)
Dept: SLEEP MEDICINE | Facility: HOSPITAL | Age: 83
Discharge: HOME | End: 2023-03-21
Payer: MEDICARE

## 2023-03-21 DIAGNOSIS — G47.33 OSA (OBSTRUCTIVE SLEEP APNEA): ICD-10-CM

## 2023-03-21 PROCEDURE — 95806 SLEEP STUDY UNATT&RESP EFFT: CPT

## 2023-03-22 ENCOUNTER — TREATMENT (OUTPATIENT)
Dept: PHYSICAL THERAPY | Facility: CLINIC | Age: 83
End: 2023-03-22
Payer: MEDICARE

## 2023-03-22 DIAGNOSIS — M25.532 PAIN IN LEFT WRIST: Primary | ICD-10-CM

## 2023-03-22 DIAGNOSIS — S62.102D CLOSED FRACTURE OF LEFT WRIST WITH ROUTINE HEALING, SUBSEQUENT ENCOUNTER: ICD-10-CM

## 2023-03-22 PROCEDURE — 97140 MANUAL THERAPY 1/> REGIONS: CPT | Performed by: PHYSICAL THERAPIST

## 2023-03-22 PROCEDURE — 97110 THERAPEUTIC EXERCISES: CPT | Performed by: PHYSICAL THERAPIST

## 2023-03-22 NOTE — PROGRESS NOTES
Physical Therapy Daily Treatment Note    [facility], [address]  [place]  [address] [phone]      Patient: Christopher Talbert   : 1940  Diagnosis/ICD-10 Code:  Pain in left wrist [M25.532]  Referring practitioner: No ref. provider found Dr. Ruiz  Date of Initial Visit: 3/22/2023  Today's Date: 3/22/2023  Patient seen for 24 sessions.  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      VISIT#: 24      Subjective :  No complaints of pain.  He reports that he will be going to [place] in April to live.  He plans on being discharged from PT at the end of March.         Objective     See Exercise, Manual, and Modality Logs for complete treatment.   Progression as noted.       Patient Education:      Assessment/Plan :  Pt. Has quite a bit of difficulty using 3# weight for wrist strengthening, decreased to 2# but more sets performed.       Progress strengthening /stabilization /functional activity  : anticipate discharge in 2 visits.             Timed:         Manual Therapy:  10       mins  95738;     Therapeutic Exercise:   20      mins  24618;     Neuromuscular Indra:       mins  33907;    Therapeutic Activity:          mins  28365;     Gait Training:           mins  08122;     Ultrasound:          mins  78156;    Ionto                                   mins   36996  Self Care                            mins   13807  Aquatic                               mins 18584    Un-Timed:  Electrical Stimulation:         mins  72755 ( );  Traction          mins 18870      Timed Treatment:  30    mins   Total Treatment:    30    mins    Alyson Neal PTA  Physical Therapist Assistant   [place] license:  #[id number]

## 2023-03-28 ENCOUNTER — TREATMENT (OUTPATIENT)
Dept: PHYSICAL THERAPY | Facility: CLINIC | Age: 83
End: 2023-03-28
Payer: MEDICARE

## 2023-03-28 DIAGNOSIS — G47.33 OSA (OBSTRUCTIVE SLEEP APNEA): Primary | ICD-10-CM

## 2023-03-28 DIAGNOSIS — S62.102D CLOSED FRACTURE OF LEFT WRIST WITH ROUTINE HEALING, SUBSEQUENT ENCOUNTER: ICD-10-CM

## 2023-03-28 DIAGNOSIS — M25.532 PAIN IN LEFT WRIST: Primary | ICD-10-CM

## 2023-03-28 PROCEDURE — 97140 MANUAL THERAPY 1/> REGIONS: CPT | Performed by: PHYSICAL THERAPIST

## 2023-03-28 PROCEDURE — 97110 THERAPEUTIC EXERCISES: CPT | Performed by: PHYSICAL THERAPIST

## 2023-03-28 NOTE — PROGRESS NOTES
Physical Therapy Daily Treatment Note    [address] [place] [address], [address]  [place] [address]  [phone]    Patient: Christopher Talbert  : 1940  Referring practitioner: No ref. provider found  Diagnosis/ ICD10 code: Pain in left wrist [M25.532]  Today's Date: 3/28/2023    VISIT#: 25    Subjective   Pt reports: doing pretty well, feels at least 75-80 % improved. Feels he is ready to be DC after next appointment.       Objective          Active Range of Motion     Left Wrist   Wrist flexion: 50 degrees   Wrist extension: 45 degrees   Radial deviation: 20 degrees   Ulnar deviation: 20 degrees     Strength/Myotome Testing     Left Wrist/Hand   Wrist extension: 5  Wrist flexion: 5  Radial deviation: 4+  Ulnar deviation: 4+     (2nd hand position)   Left  strength (2nd hand position) 20 lbs    Right Wrist/Hand      (2nd hand position)   Right  strength (2nd hand position) 60 lbs        See Exercise, Manual, and Modality Logs for complete treatment.     Patient Education:    Assessment & Plan     Assessment    Assessment details: Pt has been seen for 25 visits s/p L wrist fx last Aug. He is made slow but steady progress. Pt presents with Improved ROM and functional use of L hand, improved  strength. Voices readiness for DC with independent program after his next visit.   Has met all STGs, 4/6 LTGs met, 2/6 partially met .    Goals  Plan Goals: Plan Goals:  STGs to be met in 4 weeks:  --  Obtain objective measures for ROM/strength/edema at first follow-up visit after eval (and adjust/add to goals accordingly) pending HR is in stable range/medical clearance.-MET  --  Require </= 3 cues with HEP performance.-MET  --  Pain levels at worst </= 4/10.-MET  --  Pt to reduce frequency of wearing brace to 50% of time per MD instruction without complications.-MET  --  Quick DASH score </= 50% disability.- MET  --  Be able to lift cell phone with LUE without pain and no brace.-MET    LTGs to be met by end of  POC:  --  Pt to completely d/c use of brace per MD approval without complications.-MET  --  Require no cues with HEP performance for d/c planning- MET  --  Pain levels at worst </= 2/10.- MET  --  Quick DASH score </= 50% disability.- MET  --  Be able to open jar with BUEs without L wrist pain and no brace.  --  Edema measures of LUE = to that of RUE.           Anticipate DC next Visit            Timed:         Manual Therapy:   10      mins  80325;     Therapeutic Exercise:    20     mins  01787;     Neuromuscular Indra:        mins  77974;    Therapeutic Activity:          mins  95165;     Gait Training:           mins  37717;     Ultrasound:          mins  75670;    Ionto                                   mins   49222  Self Care                            mins   50803  Canal repositioning           mins    36740        Timed Treatment:  30    mins   Total Treatment:    30    mins    Travon Flynn PT, CLT  Physical Therapist  [place] License:  # [id number]

## 2023-03-31 ENCOUNTER — TREATMENT (OUTPATIENT)
Dept: PHYSICAL THERAPY | Facility: CLINIC | Age: 83
End: 2023-03-31
Payer: MEDICARE

## 2023-03-31 DIAGNOSIS — S62.102D CLOSED FRACTURE OF LEFT WRIST WITH ROUTINE HEALING, SUBSEQUENT ENCOUNTER: ICD-10-CM

## 2023-03-31 DIAGNOSIS — M25.532 PAIN IN LEFT WRIST: Primary | ICD-10-CM

## 2023-03-31 PROCEDURE — 97110 THERAPEUTIC EXERCISES: CPT | Performed by: PHYSICAL THERAPIST

## 2023-03-31 PROCEDURE — 97140 MANUAL THERAPY 1/> REGIONS: CPT | Performed by: PHYSICAL THERAPIST

## 2023-04-17 ENCOUNTER — OFFICE VISIT (OUTPATIENT)
Dept: PULMONOLOGY | Facility: HOSPITAL | Age: 83
End: 2023-04-17
Payer: MEDICARE

## 2023-04-17 VITALS
SYSTOLIC BLOOD PRESSURE: 152 MMHG | WEIGHT: 197.6 LBS | DIASTOLIC BLOOD PRESSURE: 68 MMHG | HEIGHT: 71 IN | TEMPERATURE: 98.7 F | RESPIRATION RATE: 18 BRPM | OXYGEN SATURATION: 98 % | HEART RATE: 57 BPM | BODY MASS INDEX: 27.66 KG/M2

## 2023-04-17 DIAGNOSIS — G47.33 OSA (OBSTRUCTIVE SLEEP APNEA): Primary | ICD-10-CM

## 2023-04-17 DIAGNOSIS — C45.9 MESOTHELIOMA: ICD-10-CM

## 2023-04-17 RX ORDER — OMEPRAZOLE 20 MG/1
CAPSULE, DELAYED RELEASE ORAL
Qty: 90 CAPSULE | Refills: 3 | Status: SHIPPED | OUTPATIENT
Start: 2023-04-17

## 2023-04-17 NOTE — PROGRESS NOTES
PULMONARY/ CRITICAL CARE/ SLEEP MEDICINE OUTPATIENT CONSULT/ FOLLOW UP NOTE        Patient Name:  Christopher Talbert    :  1940    Medical Record:  [id number]    PRIMARY CARE PHYSICIAN     Eliot Millard DO    REASON FOR CONSULTATION    Christopher Talbert is a 82 y.o. male who is referred for consultation for history of mesothelioma, COPD, and JEFF  REVIEW OF SYSTEMS    Constitutional:  Denies fever or chills   Eyes:  Denies change in visual acuity   HENT:  Denies nasal congestion or sore throat   Respiratory:  Denies cough or shortness of breath   Cardiovascular:  Denies chest pain or edema   GI:  Denies abdominal pain, nausea, vomiting, bloody stools or diarrhea   :  Denies dysuria   Musculoskeletal:  Denies back pain or joint pain   Integument:  Denies rash   Neurologic:  Denies headache, focal weakness or sensory changes   Endocrine:  Denies polyuria or polydipsia   Lymphatic:  Denies swollen glands   Psychiatric:  Denies depression or anxiety     MEDICAL HISTORY    Past Medical History:   Diagnosis Date   • Chronic obstructive pulmonary disease 10/14/2022   • COPD (chronic obstructive pulmonary disease)    • GERD without esophagitis 10/14/2022   • Hypertension    • Leukocytosis, unspecified type 2022   • Mesothelioma    • JEFF on CPAP 10/14/2022   • Pneumonia of right middle lobe due to infectious organism 2022        SURGICAL HISTORY    Past Surgical History:   Procedure Laterality Date   • APPENDECTOMY     • LUNG REMOVAL, PARTIAL      5-6 years ago   • SPINE SURGERY          FAMILY HISTORY    Family History   Problem Relation Age of Onset   • Heart disease Mother    • Heart disease Father        SOCIAL HISTORY    Social History     Tobacco Use   • Smoking status: Former     Passive exposure: Past   • Smokeless tobacco: Never   Substance Use Topics   • Alcohol use: Yes     Comment: 1 every few weeks        ALLERGIES    Allergies   Allergen Reactions   • Penicillins Unknown - High Severity  "        MEDICATIONS    Current Outpatient Medications on File Prior to Visit   Medication Sig Dispense Refill   • albuterol sulfate  (90 Base) MCG/ACT inhaler Inhale 1 puff Every 4 (Four) Hours As Needed for Wheezing. 18 g 3   • atorvastatin (LIPITOR) 20 MG tablet Take 1 tablet by mouth Daily.     • benzonatate (TESSALON) 100 MG capsule Take 1 capsule by mouth 3 (Three) Times a Day As Needed for Cough. 90 capsule 3   • cholecalciferol (VITAMIN D3) 25 MCG (1000 UT) tablet Take 1 tablet by mouth Daily.     • Fluticasone-Umeclidin-Vilant (Trelegy Ellipta) 100-62.5-25 MCG/ACT inhaler Inhale 1 puff Daily. 60 each 3   • hydroCHLOROthiazide (HYDRODIURIL) 12.5 MG tablet Take 2 tablets by mouth Daily. 90 tablet 3   • ipratropium-albuterol (DUO-NEB) 0.5-2.5 mg/3 ml nebulizer Take 3 mL by nebulization Every 4 (Four) Hours As Needed for Wheezing or Shortness of Air. 360 mL 3   • omeprazole OTC (PrilOSEC OTC) 20 MG EC tablet Take 1 tablet by mouth Daily. 90 tablet 3   • Potassium 99 MG tablet      • vitamin B-12 (CYANOCOBALAMIN) 1000 MCG tablet Take 1 tablet by mouth Daily.       No current facility-administered medications on file prior to visit.       PHYSICAL EXAM    Vitals:    04/17/23 0947   BP: 152/68   BP Location: Left arm   Patient Position: Sitting   Cuff Size: Adult   Pulse: 57   Resp: 18   Temp: 98.7 °F (37.1 °C)   TempSrc: Oral   SpO2: 98%   Weight: 89.6 kg (197 lb 9.6 oz)   Height: 180.3 cm (71\")        Constitutional:  Well developed, well nourished, no acute distress, non-toxic appearance   Eyes:  PERRL, conjunctiva normal   HENT:  Atraumatic, external ears normal, nose normal, oropharynx moist, no pharyngeal exudates. mallampatti   Neck- normal range of motion, no tenderness, supple   Respiratory:  No respiratory distress, normal breath sounds, no rales, no wheezing   Cardiovascular:  Normal rate, normal rhythm, no murmurs, no gallops, no rubs   GI:  Soft, nondistended, normal bowel sounds, nontender, no " organomegaly, no mass, no rebound, no guarding   :  No costovertebral angle tenderness   Musculoskeletal:  No edema, no tenderness, no deformities. Back- no tenderness  Integument:  Well hydrated, no rash   Lymphatic:  No lymphadenopathy noted   Neurologic:  Alert & oriented x 3, CN 2-12 normal, normal motor function, normal sensory function, no focal deficits noted   Psychiatric:  Speech and behavior appropriate     CT Chest Without Contrast Diagnostic    Result Date: 3/16/2023  Impression: 1. Bilateral groundglass nodular densities, measuring up to 1.3 cm on the left. The patient has a history of partial right lung resection. It is otherwise unclear from the history whether the patient has a history of lung cancer or not. Follow-up chest CT in 3 months recommended per Fleischner guidelines. 2. Postsurgical changes from right upper lobectomy. There is interlobular septal thickening in the bases bilaterally suggesting mild/early chronic interstitial lung disease. 3. Additional findings as given above. Electronically Signed: Jose A Gallardo  3/16/2023 2:41 PM EDT  Workstation ID: [id number]     Results for orders placed during the hospital encounter of 07/21/22    Adult Transthoracic Echo Complete W/ Cont if Necessary Per Protocol    Interpretation Summary  · Left ventricular ejection fraction appears to be 56 - 60%.  · The right ventricular cavity is mildly dilated.  · No pericardial effusion noted      ASSESSMENT & PLAN:        81 y/o x-smoker of 62 years (quit 6-7 years) here for COPD and JEFF.  Recently moved in July from [place].  Was followed by pulmonologist  History of Right upper Lobectomy (6-7 years ago) for Mesothelioma.    Done at [facility]  20 years in [employer]    CT chest 3/16/2023 bilateral groundglass nodular density largest 13 mm on the left       COPD, will obtain PFTs      Echocardiogram 7/25/2022: Normal LVEF of 56-60 with mildly dilated RV     JEFF, not currently wearing d/t  malfunction   Original HST in [facility] 6 years ago     Bradycardia      PLAN:     Repeat chest CT without contrast and July 2023     Repeat HST showed mild case of obstructive sleep apnea with AHI 12 patient would like a referral to ENT for [facility] consultation  -struggled to tolerate CPAP     [facility], more than 3 years ago from Dr Ramon Marie ([phone])  Reports not available for me okay     Trelegy daily, and Albuterol PRN, and nebs PRN     Follow up 3 mo     This document has been electronically signed by  Belinda Peters MD  10:39 EDT

## 2023-04-24 ENCOUNTER — HOSPITAL ENCOUNTER (EMERGENCY)
Facility: HOSPITAL | Age: 83
Discharge: HOME | End: 2023-04-24
Attending: EMERGENCY MEDICINE | Admitting: EMERGENCY MEDICINE
Payer: MEDICARE

## 2023-04-24 VITALS
HEART RATE: 102 BPM | WEIGHT: 195.99 LBS | TEMPERATURE: 97.9 F | BODY MASS INDEX: 27.44 KG/M2 | OXYGEN SATURATION: 92 % | RESPIRATION RATE: 18 BRPM | SYSTOLIC BLOOD PRESSURE: 137 MMHG | HEIGHT: 71 IN | DIASTOLIC BLOOD PRESSURE: 71 MMHG

## 2023-04-24 DIAGNOSIS — S01.111A LACERATION OF RIGHT EYEBROW, INITIAL ENCOUNTER: Primary | ICD-10-CM

## 2023-04-24 PROCEDURE — 99282 EMERGENCY DEPT VISIT SF MDM: CPT

## 2023-04-24 RX ORDER — LIDOCAINE HYDROCHLORIDE AND EPINEPHRINE 10; 10 MG/ML; UG/ML
10 INJECTION, SOLUTION INFILTRATION; PERINEURAL ONCE
Status: COMPLETED | OUTPATIENT
Start: 2023-04-24 | End: 2023-04-24

## 2023-04-24 RX ORDER — LIDOCAINE HYDROCHLORIDE AND EPINEPHRINE 10; 10 MG/ML; UG/ML
INJECTION, SOLUTION INFILTRATION; PERINEURAL
Status: COMPLETED
Start: 2023-04-24 | End: 2023-04-24

## 2023-04-24 RX ORDER — DIAPER,BRIEF,INFANT-TODD,DISP
1 EACH MISCELLANEOUS ONCE
Status: COMPLETED | OUTPATIENT
Start: 2023-04-24 | End: 2023-04-24

## 2023-04-24 RX ADMIN — LIDOCAINE HYDROCHLORIDE AND EPINEPHRINE 10 ML: 10; 10 INJECTION, SOLUTION INFILTRATION; PERINEURAL at 13:10

## 2023-04-24 RX ADMIN — Medication 0.9 G: at 13:18

## 2023-04-24 RX ADMIN — LIDOCAINE HYDROCHLORIDE,EPINEPHRINE BITARTRATE 10 ML: 10; .01 INJECTION, SOLUTION INFILTRATION; PERINEURAL at 13:10

## 2023-04-24 NOTE — ED PROVIDER NOTES
Subjective   History of Present Illness  Patient is an 82-year-old male who is slipped and fallen.  He complains of a laceration to his right eyebrow.  He denies other complaints.  No loss of consciousness dizziness or vomiting.        Review of Systems    Past Medical History:   Diagnosis Date   • Chronic obstructive pulmonary disease 10/14/2022   • COPD (chronic obstructive pulmonary disease)    • GERD without esophagitis 10/14/2022   • Hypertension    • Leukocytosis, unspecified type 7/21/2022   • Mesothelioma    • JEFF on CPAP 10/14/2022   • Pneumonia of right middle lobe due to infectious organism 7/24/2022       Allergies   Allergen Reactions   • Penicillins Unknown - High Severity       Past Surgical History:   Procedure Laterality Date   • APPENDECTOMY     • LUNG REMOVAL, PARTIAL      5-6 years ago   • SPINE SURGERY         Family History   Problem Relation Age of Onset   • Heart disease Mother    • Heart disease Father        Social History     Socioeconomic History   • Marital status:    Tobacco Use   • Smoking status: Former     Passive exposure: Past   • Smokeless tobacco: Never   Vaping Use   • Vaping Use: Never used   Substance and Sexual Activity   • Alcohol use: Yes     Comment: 1 every few weeks   • Drug use: Not Currently   • Sexual activity: Defer           Objective   Physical Exam  HEENT exam shows the patient have a 2 cm laceration above his right eyebrow.  Neurologic exam is nonfocal.  No other abnormalities noted.  Procedures  Patient had his wound anesthetized with 1% lidocaine with epinephrine.  The wound was cleaned and closed under sterile prep drape using 5-0 nylon.  No foreign body was noted.  Sterile dressing was applied.         ED Course                                           Medical Decision Making  Patient had 2 cm eyebrow laceration repaired as noted above.  Will be discharged with standard wound care instructions.  We will see his MD in 7 to 10 days for suture  removal.    Risk  Prescription drug management.          Final diagnoses:   Laceration of right eyebrow, initial encounter       ED Disposition  ED Disposition     ED Disposition   Discharge    Condition   Stable    Comment   --             No follow-up provider specified.       Medication List      No changes were made to your prescriptions during this visit.          Jose A Otoole MD  04/24/23 1931

## 2024-05-20 DIAGNOSIS — J44.9 CHRONIC OBSTRUCTIVE PULMONARY DISEASE, UNSPECIFIED COPD TYPE: ICD-10-CM

## 2024-05-20 RX ORDER — ALBUTEROL SULFATE 90 UG/1
AEROSOL, METERED RESPIRATORY (INHALATION)
Qty: 17 G | Refills: 4 | Status: SHIPPED | OUTPATIENT
Start: 2024-05-20

## 2024-06-03 NOTE — OUTREACH NOTE
Prep Survey    Flowsheet Row Responses   [facility] facility patient discharged from? [place]   Is LACE score < 7 ? No   Emergency Room discharge w/ pulse ox? No   Eligibility Readm Mgmt   Discharge diagnosis Pneumonia    Does the patient have one of the following disease processes/diagnoses(primary or secondary)? COPD/Pneumonia   Does the patient have Home health ordered? No   Is there a DME ordered? No   Prep survey completed? Yes          AMILCAR MELCHOR - Registered Nurse        
Allergy;
Head ache

## 2025-03-20 ENCOUNTER — READMISSION MANAGEMENT (OUTPATIENT)
Dept: CALL CENTER | Facility: HOSPITAL | Age: 85
End: 2025-03-20
Payer: MEDICARE

## 2025-03-20 NOTE — OUTREACH NOTE
Prep Survey      Flowsheet Row Responses   [facility] facility patient discharged from? Non-[facility]   Is LACE score < 7 ? Non-BH Discharge   Eligibility Not Eligible   What are the reasons patient is not eligible? Other  [[address]]   Does the patient have one of the following disease processes/diagnoses(primary or secondary)? Other   Prep survey completed? Yes            Valeria MURPHY - Registered Nurse

## 2025-04-02 ENCOUNTER — READMISSION MANAGEMENT (OUTPATIENT)
Dept: CALL CENTER | Facility: HOSPITAL | Age: 85
End: 2025-04-02
Payer: MEDICARE

## 2025-04-02 NOTE — OUTREACH NOTE
Prep Survey      Flowsheet Row Responses   [facility] facility patient discharged from? Non-[facility]   Is LACE score < 7 ? Non-BH Discharge   Eligibility Not Eligible   What are the reasons patient is not eligible? [facility]   Does the patient have one of the following disease processes/diagnoses(primary or secondary)? Other   Prep survey completed? Yes            NICKIE PRICE - Registered Nurse